# Patient Record
Sex: FEMALE | Race: WHITE | NOT HISPANIC OR LATINO | Employment: UNEMPLOYED | URBAN - METROPOLITAN AREA
[De-identification: names, ages, dates, MRNs, and addresses within clinical notes are randomized per-mention and may not be internally consistent; named-entity substitution may affect disease eponyms.]

---

## 2018-07-25 ENCOUNTER — OFFICE VISIT CONVERTED (OUTPATIENT)
Dept: OTHER | Facility: HOSPITAL | Age: 38
End: 2018-07-25
Attending: INTERNAL MEDICINE

## 2019-01-09 ENCOUNTER — HOSPITAL ENCOUNTER (OUTPATIENT)
Dept: INFUSION THERAPY | Facility: HOSPITAL | Age: 39
Discharge: HOME | End: 2019-01-09
Attending: INTERNAL MEDICINE

## 2019-01-28 ENCOUNTER — HOSPITAL ENCOUNTER (OUTPATIENT)
Dept: INFUSION THERAPY | Facility: HOSPITAL | Age: 39
Discharge: HOME | End: 2019-01-28
Attending: INTERNAL MEDICINE

## 2019-02-12 ENCOUNTER — HOSPITAL ENCOUNTER (OUTPATIENT)
Dept: INFUSION THERAPY | Facility: HOSPITAL | Age: 39
Discharge: HOME | End: 2019-02-12
Attending: INTERNAL MEDICINE

## 2019-03-05 ENCOUNTER — HOSPITAL ENCOUNTER (OUTPATIENT)
Dept: INFUSION THERAPY | Facility: HOSPITAL | Age: 39
Discharge: HOME | End: 2019-03-05
Attending: INTERNAL MEDICINE

## 2019-04-08 ENCOUNTER — HOSPITAL ENCOUNTER (OUTPATIENT)
Dept: INFUSION THERAPY | Facility: HOSPITAL | Age: 39
Discharge: HOME | End: 2019-04-08
Attending: INTERNAL MEDICINE

## 2019-04-20 ENCOUNTER — HOSPITAL ENCOUNTER (OUTPATIENT)
Dept: URGENT CARE | Facility: CLINIC | Age: 39
Discharge: HOME | End: 2019-04-20
Attending: NURSE PRACTITIONER

## 2019-04-23 LAB
AMPICILLIN SUSC ISLT: 0.5
BACTERIA UR CULT: ABNORMAL
CEFOTAXIME SUSC ISLT: <=0.12
CEFTRIAXONE SUSC ISLT: 0.25
LEVOFLOXACIN SUSC ISLT: 1
PENICILLIN G SUSC ISLT: 0.12
TETRACYCLINE SUSC ISLT: >=16
VANCOMYCIN SUSC ISLT: 0.5

## 2019-04-29 ENCOUNTER — HOSPITAL ENCOUNTER (OUTPATIENT)
Dept: INFUSION THERAPY | Facility: HOSPITAL | Age: 39
Discharge: HOME | End: 2019-04-29
Attending: INTERNAL MEDICINE

## 2019-05-15 ENCOUNTER — HOSPITAL ENCOUNTER (OUTPATIENT)
Dept: OTHER | Facility: HOSPITAL | Age: 39
Discharge: HOME | End: 2019-05-15
Attending: INTERNAL MEDICINE

## 2019-05-15 ENCOUNTER — OFFICE VISIT CONVERTED (OUTPATIENT)
Dept: OTHER | Facility: HOSPITAL | Age: 39
End: 2019-05-15
Attending: INTERNAL MEDICINE

## 2019-05-15 LAB
ALBUMIN SERPL-MCNC: 3.8 G/DL (ref 3.5–5)
ALBUMIN/GLOB SERPL: 1.4 {RATIO} (ref 1.4–2.6)
ALP SERPL-CCNC: 55 U/L (ref 42–98)
ALT SERPL-CCNC: 12 U/L (ref 10–40)
ANION GAP SERPL CALC-SCNC: 16 MMOL/L (ref 8–19)
AST SERPL-CCNC: 17 U/L (ref 15–50)
BASOPHILS # BLD AUTO: 0.04 10*3/UL (ref 0–0.2)
BASOPHILS NFR BLD AUTO: 0.6 % (ref 0–3)
BILIRUB SERPL-MCNC: 0.29 MG/DL (ref 0.2–1.3)
BUN SERPL-MCNC: 12 MG/DL (ref 5–25)
BUN/CREAT SERPL: 20 {RATIO} (ref 6–20)
CALCIUM SERPL-MCNC: 9 MG/DL (ref 8.7–10.4)
CHLORIDE SERPL-SCNC: 102 MMOL/L (ref 99–111)
CONV ABS IMM GRAN: 0.01 10*3/UL (ref 0–0.2)
CONV CO2: 26 MMOL/L (ref 22–32)
CONV IMMATURE GRAN: 0.1 % (ref 0–1.8)
CONV TOTAL PROTEIN: 6.6 G/DL (ref 6.3–8.2)
CREAT UR-MCNC: 0.61 MG/DL (ref 0.5–0.9)
DEPRECATED RDW RBC AUTO: 43.5 FL (ref 36.4–46.3)
EOSINOPHIL # BLD AUTO: 0.15 10*3/UL (ref 0–0.7)
EOSINOPHIL # BLD AUTO: 2.2 % (ref 0–7)
ERYTHROCYTE [DISTWIDTH] IN BLOOD BY AUTOMATED COUNT: 12.5 % (ref 11.7–14.4)
GFR SERPLBLD BASED ON 1.73 SQ M-ARVRAT: >60 ML/MIN/{1.73_M2}
GLOBULIN UR ELPH-MCNC: 2.8 G/DL (ref 2–3.5)
GLUCOSE SERPL-MCNC: 72 MG/DL (ref 65–99)
HBA1C MFR BLD: 14 G/DL (ref 12–16)
HCT VFR BLD AUTO: 43 % (ref 37–47)
LYMPHOCYTES # BLD AUTO: 2.52 10*3/UL (ref 1–5)
MCH RBC QN AUTO: 30.6 PG (ref 27–31)
MCHC RBC AUTO-ENTMCNC: 32.6 G/DL (ref 33–37)
MCV RBC AUTO: 94.1 FL (ref 81–99)
MONOCYTES # BLD AUTO: 0.37 10*3/UL (ref 0.2–1.2)
MONOCYTES NFR BLD AUTO: 5.3 % (ref 3–10)
NEUTROPHILS # BLD AUTO: 3.85 10*3/UL (ref 2–8)
NEUTROPHILS NFR BLD AUTO: 55.5 % (ref 30–85)
NRBC CBCN: 0 % (ref 0–0.7)
OSMOLALITY SERPL CALC.SUM OF ELEC: 288 MOSM/KG (ref 273–304)
PLATELET # BLD AUTO: 284 10*3/UL (ref 130–400)
PMV BLD AUTO: 9.3 FL (ref 9.4–12.3)
POTASSIUM SERPL-SCNC: 3.8 MMOL/L (ref 3.5–5.3)
RBC # BLD AUTO: 4.57 10*6/UL (ref 4.2–5.4)
SODIUM SERPL-SCNC: 140 MMOL/L (ref 135–147)
T4 FREE SERPL-MCNC: 1.1 NG/DL (ref 0.9–1.8)
TSH SERPL-ACNC: 1.71 M[IU]/L (ref 0.27–4.2)
VARIANT LYMPHS NFR BLD MANUAL: 36.3 % (ref 20–45)
WBC # BLD AUTO: 6.94 10*3/UL (ref 4.8–10.8)

## 2019-05-17 ENCOUNTER — HOSPITAL ENCOUNTER (OUTPATIENT)
Dept: INFUSION THERAPY | Facility: HOSPITAL | Age: 39
Discharge: HOME | End: 2019-05-17
Attending: INTERNAL MEDICINE

## 2019-06-12 ENCOUNTER — HOSPITAL ENCOUNTER (OUTPATIENT)
Dept: URGENT CARE | Facility: CLINIC | Age: 39
Discharge: HOME | End: 2019-06-12

## 2019-06-14 ENCOUNTER — HOSPITAL ENCOUNTER (OUTPATIENT)
Dept: INFUSION THERAPY | Facility: HOSPITAL | Age: 39
Discharge: HOME | End: 2019-06-14
Attending: INTERNAL MEDICINE

## 2019-06-14 LAB — BACTERIA SPEC AEROBE CULT: NORMAL

## 2019-06-19 ENCOUNTER — HOSPITAL ENCOUNTER (OUTPATIENT)
Dept: URGENT CARE | Facility: CLINIC | Age: 39
Discharge: HOME | End: 2019-06-19
Attending: EMERGENCY MEDICINE

## 2019-07-01 ENCOUNTER — HOSPITAL ENCOUNTER (OUTPATIENT)
Dept: INFUSION THERAPY | Facility: HOSPITAL | Age: 39
Discharge: HOME | End: 2019-07-01
Attending: INTERNAL MEDICINE

## 2019-07-13 ENCOUNTER — HOSPITAL ENCOUNTER (OUTPATIENT)
Dept: URGENT CARE | Facility: CLINIC | Age: 39
Discharge: HOME | End: 2019-07-13

## 2019-07-15 LAB — BACTERIA UR CULT: NORMAL

## 2019-08-05 ENCOUNTER — HOSPITAL ENCOUNTER (OUTPATIENT)
Dept: INFUSION THERAPY | Facility: HOSPITAL | Age: 39
Discharge: HOME | End: 2019-08-05
Attending: INTERNAL MEDICINE

## 2019-08-28 ENCOUNTER — HOSPITAL ENCOUNTER (OUTPATIENT)
Dept: INFUSION THERAPY | Facility: HOSPITAL | Age: 39
Discharge: HOME | End: 2019-08-28
Attending: INTERNAL MEDICINE

## 2019-09-06 ENCOUNTER — HOSPITAL ENCOUNTER (OUTPATIENT)
Dept: GENERAL RADIOLOGY | Facility: HOSPITAL | Age: 39
Discharge: HOME | End: 2019-09-06
Attending: PHYSICIAN ASSISTANT

## 2019-10-15 ENCOUNTER — HOSPITAL ENCOUNTER (OUTPATIENT)
Dept: INFUSION THERAPY | Facility: HOSPITAL | Age: 39
Discharge: HOME | End: 2019-10-15
Attending: INTERNAL MEDICINE

## 2019-10-30 ENCOUNTER — HOSPITAL ENCOUNTER (OUTPATIENT)
Dept: INFUSION THERAPY | Facility: HOSPITAL | Age: 39
Discharge: HOME | End: 2019-10-30
Attending: INTERNAL MEDICINE

## 2019-11-19 ENCOUNTER — HOSPITAL ENCOUNTER (OUTPATIENT)
Dept: INFUSION THERAPY | Facility: HOSPITAL | Age: 39
Discharge: HOME | End: 2019-11-19
Attending: INTERNAL MEDICINE

## 2019-12-10 ENCOUNTER — HOSPITAL ENCOUNTER (OUTPATIENT)
Dept: INFUSION THERAPY | Facility: HOSPITAL | Age: 39
Discharge: HOME | End: 2019-12-10
Attending: INTERNAL MEDICINE

## 2020-01-06 ENCOUNTER — HOSPITAL ENCOUNTER (OUTPATIENT)
Dept: INFUSION THERAPY | Facility: HOSPITAL | Age: 40
Discharge: HOME | End: 2020-01-06
Attending: INTERNAL MEDICINE

## 2020-01-06 LAB
ABO GROUP BLD: NORMAL
BLD GP AB SCN SERPL QL: NORMAL
CONV ABD CONTROL: NORMAL
RH BLD: NORMAL

## 2020-02-03 ENCOUNTER — HOSPITAL ENCOUNTER (OUTPATIENT)
Dept: INFUSION THERAPY | Facility: HOSPITAL | Age: 40
Discharge: HOME | End: 2020-02-03
Attending: INTERNAL MEDICINE

## 2020-02-03 LAB
ABO GROUP BLD: NORMAL
ABO GROUP BLD: NORMAL
BLD GP AB SCN SERPL QL: NORMAL
CONV ABD CONTROL: NORMAL
RH BLD: NORMAL
RH BLD: NORMAL

## 2020-02-26 ENCOUNTER — HOSPITAL ENCOUNTER (OUTPATIENT)
Dept: INFUSION THERAPY | Facility: HOSPITAL | Age: 40
Discharge: HOME | End: 2020-02-26
Attending: INTERNAL MEDICINE

## 2020-03-10 ENCOUNTER — HOSPITAL ENCOUNTER (OUTPATIENT)
Dept: OTHER | Facility: HOSPITAL | Age: 40
Discharge: HOME | End: 2020-03-10
Attending: INTERNAL MEDICINE

## 2020-03-10 LAB
APPEARANCE UR: CLEAR
BILIRUB UR QL: NEGATIVE
COLOR UR: YELLOW
CONV BACTERIA: NEGATIVE
CONV COLLECTION SOURCE (UA): ABNORMAL
CONV HYALINE CASTS IN URINE MICRO: ABNORMAL /[LPF]
CONV UROBILINOGEN IN URINE BY AUTOMATED TEST STRIP: 1 {EHRLICHU}/DL (ref 0.1–1)
GLUCOSE UR QL: NEGATIVE MG/DL
HGB UR QL STRIP: ABNORMAL
KETONES UR QL STRIP: NEGATIVE MG/DL
LEUKOCYTE ESTERASE UR QL STRIP: ABNORMAL
NITRITE UR QL STRIP: NEGATIVE
PH UR STRIP.AUTO: 7.5 [PH] (ref 5–8)
PROT UR QL: ABNORMAL MG/DL
RBC #/AREA URNS HPF: ABNORMAL /[HPF]
SP GR UR: 1.02 (ref 1–1.03)
SQUAMOUS SPT QL MICRO: ABNORMAL /[HPF]
WBC #/AREA URNS HPF: ABNORMAL /[HPF]

## 2020-03-19 ENCOUNTER — HOSPITAL ENCOUNTER (OUTPATIENT)
Dept: INFUSION THERAPY | Facility: HOSPITAL | Age: 40
Discharge: HOME | End: 2020-03-19
Attending: INTERNAL MEDICINE

## 2020-04-06 ENCOUNTER — HOSPITAL ENCOUNTER (OUTPATIENT)
Dept: INFUSION THERAPY | Facility: HOSPITAL | Age: 40
Discharge: HOME | End: 2020-04-06
Attending: INTERNAL MEDICINE

## 2020-04-27 ENCOUNTER — HOSPITAL ENCOUNTER (OUTPATIENT)
Dept: INFUSION THERAPY | Facility: HOSPITAL | Age: 40
Discharge: HOME | End: 2020-04-27
Attending: INTERNAL MEDICINE

## 2020-05-21 ENCOUNTER — HOSPITAL ENCOUNTER (OUTPATIENT)
Dept: INFUSION THERAPY | Facility: HOSPITAL | Age: 40
Setting detail: RECURRING SERIES
Discharge: HOME | End: 2020-08-19
Attending: INTERNAL MEDICINE

## 2020-07-06 ENCOUNTER — OFFICE VISIT CONVERTED (OUTPATIENT)
Dept: OTHER | Facility: HOSPITAL | Age: 40
End: 2020-07-06
Attending: INTERNAL MEDICINE

## 2020-08-19 ENCOUNTER — HOSPITAL ENCOUNTER (OUTPATIENT)
Dept: INFUSION THERAPY | Facility: HOSPITAL | Age: 40
Discharge: HOME | End: 2020-08-19
Attending: INTERNAL MEDICINE

## 2020-09-04 ENCOUNTER — HOSPITAL ENCOUNTER (OUTPATIENT)
Dept: INFUSION THERAPY | Facility: HOSPITAL | Age: 40
Discharge: HOME | End: 2020-09-04
Attending: INTERNAL MEDICINE

## 2020-09-04 LAB
ABO GROUP BLD: NORMAL
BLD GP AB SCN SERPL QL: NORMAL
CONV ABD CONTROL: NORMAL
RH BLD: NORMAL

## 2020-09-25 ENCOUNTER — HOSPITAL ENCOUNTER (OUTPATIENT)
Dept: INFUSION THERAPY | Facility: HOSPITAL | Age: 40
Discharge: HOME | End: 2020-09-25
Attending: INTERNAL MEDICINE

## 2020-09-25 LAB
ABO GROUP BLD: NORMAL
BLD GP AB SCN SERPL QL: NORMAL
CONV ABD CONTROL: NORMAL
RH BLD: NORMAL

## 2020-10-22 ENCOUNTER — HOSPITAL ENCOUNTER (OUTPATIENT)
Dept: INFUSION THERAPY | Facility: HOSPITAL | Age: 40
Discharge: HOME | End: 2020-10-22
Attending: INTERNAL MEDICINE

## 2020-12-02 ENCOUNTER — HOSPITAL ENCOUNTER (OUTPATIENT)
Dept: INFUSION THERAPY | Facility: HOSPITAL | Age: 40
Discharge: HOME | End: 2020-12-02
Attending: INTERNAL MEDICINE

## 2020-12-22 ENCOUNTER — HOSPITAL ENCOUNTER (OUTPATIENT)
Dept: INFUSION THERAPY | Facility: HOSPITAL | Age: 40
Discharge: HOME | End: 2020-12-22
Attending: INTERNAL MEDICINE

## 2020-12-23 LAB
ABO GROUP BLD: NORMAL
BLD GP AB SCN SERPL QL: NORMAL
BLOOD GROUP ANTIBODIES SERPL: NORMAL
CONV ABD CONTROL: NORMAL
RH BLD: NORMAL

## 2021-01-15 ENCOUNTER — HOSPITAL ENCOUNTER (OUTPATIENT)
Dept: INFUSION THERAPY | Facility: HOSPITAL | Age: 41
Discharge: HOME | End: 2021-01-15
Attending: INTERNAL MEDICINE

## 2021-01-15 LAB
ABO GROUP BLD: NORMAL
ABO GROUP BLD: NORMAL
BLD GP AB SCN SERPL QL: NORMAL
BLOOD GROUP ANTIBODIES SERPL: NORMAL
CONV ABD CONTROL: NORMAL
RH BLD: NORMAL
RH BLD: NORMAL

## 2021-02-19 ENCOUNTER — HOSPITAL ENCOUNTER (OUTPATIENT)
Dept: INFUSION THERAPY | Facility: HOSPITAL | Age: 41
Discharge: HOME | End: 2021-02-19
Attending: INTERNAL MEDICINE

## 2021-02-19 LAB
ABO GROUP BLD: NORMAL
BLD GP AB SCN SERPL QL: NORMAL
CONV ABD CONTROL: NORMAL
RH BLD: NORMAL

## 2021-03-23 ENCOUNTER — OFFICE VISIT CONVERTED (OUTPATIENT)
Dept: ORTHOPEDIC SURGERY | Facility: CLINIC | Age: 41
End: 2021-03-23
Attending: ORTHOPAEDIC SURGERY

## 2021-03-25 ENCOUNTER — HOSPITAL ENCOUNTER (OUTPATIENT)
Dept: INFUSION THERAPY | Facility: HOSPITAL | Age: 41
Discharge: HOME | End: 2021-03-25
Attending: INTERNAL MEDICINE

## 2021-04-02 ENCOUNTER — HOSPITAL ENCOUNTER (OUTPATIENT)
Dept: INFUSION THERAPY | Facility: HOSPITAL | Age: 41
Discharge: HOME | End: 2021-04-02
Attending: INTERNAL MEDICINE

## 2021-04-02 LAB
ABO GROUP BLD: NORMAL
BLD GP AB SCN SERPL QL: NORMAL
CONV ABD CONTROL: NORMAL
RH BLD: NORMAL

## 2021-04-21 ENCOUNTER — HOSPITAL ENCOUNTER (OUTPATIENT)
Dept: INFUSION THERAPY | Facility: HOSPITAL | Age: 41
Discharge: HOME | End: 2021-04-21
Attending: INTERNAL MEDICINE

## 2021-05-04 ENCOUNTER — OFFICE VISIT CONVERTED (OUTPATIENT)
Dept: ORTHOPEDIC SURGERY | Facility: CLINIC | Age: 41
End: 2021-05-04
Attending: PHYSICIAN ASSISTANT

## 2021-05-05 ENCOUNTER — HOSPITAL ENCOUNTER (OUTPATIENT)
Dept: INFUSION THERAPY | Facility: HOSPITAL | Age: 41
Discharge: HOME | End: 2021-05-05
Attending: INTERNAL MEDICINE

## 2021-05-05 LAB
ABO GROUP BLD: NORMAL
BLD GP AB SCN SERPL QL: NORMAL
CONV ABD CONTROL: NORMAL
RH BLD: NORMAL

## 2021-05-10 NOTE — H&P
"   History and Physical      Patient Name: Suzy Whitaker   Patient ID: [id number]   Sex: Female   YOB: 1980    Primary Care Provider: Liz Malcolm MD   Referring Provider: iLz Malcolm MD    Visit Date: March 23, 2021    Provider: Raphael Child MD   Location: [facility]   Location Address: [address] [place] [address]  [id number]   Location Phone: [phone]          Chief Complaint  · RIGHT SHOULDER PAIN       History Of Present Illness  Suzy Whitaker is a 41 year old /White female who presents today to [facility]. Patient presents for evaluation of right shoulder pain, she states about a month ago she went bowling and her shoulder started hurting her that night. Patient describes the pain as \"achy\" and worse with activity/ROM, pain at night and sleeping on the shoulder. Patient denies numbness/tingling, denies injury. Patient has tried heat, ice and ibuprofen with no relief. Patient carried a 30 pound baby 2 weeks ago with worse pain in the shoulder, she points to the lateral shoulder as her area of pain. Patient denies weakness, denies numbness/tingling. 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       Past Medical History  Blood Diseases; Mood disorder         Past Surgical History  Gallbladder; Port Placement         Medication List  Abilify 5 mg oral tablet; Effexor XR 75 mg oral capsule,extended release 24hr; Premarin 1.25 mg oral tablet         Allergy List  NO KNOWN DRUG ALLERGIES       Allergies Reconciled  Family Medical History  Diabetes, unspecified type; Renal Cancer         Social History  Alcohol (Current some day); Caffeine (Current every day); Second hand smoke exposure (Never); Tobacco (Never)         Review of Systems  · Constitutional  o Denies  o : fever, chills, weight " "loss  · Cardiovascular  o Denies  o : chest pain, shortness of breath  · Gastrointestinal  o Denies  o : liver disease, heartburn, nausea, blood in stools  · Genitourinary  o Denies  o : painful urination, blood in urine  · Integument  o Denies  o : rash, itching  · Neurologic  o Denies  o : headache, weakness, loss of consciousness  · Musculoskeletal  o Denies  o : painful, swollen joints  · Psychiatric  o Denies  o : drug/alcohol addiction, anxiety, depression      Vitals  Date Time BP Position Site L\R Cuff Size HR RR TEMP (F) WT  HT  BMI kg/m2 BSA m2 O2 Sat FR L/min FiO2 HC       03/23/2021 03:53 PM      59 - R  97.5 234lbs 6oz 5'  5\" 39 2.21 98 %  21%          Physical Examination  · Constitutional  o Appearance  o : well developed, well-nourished, no obvious deformities present  · Head and Face  o Head  o :   § Inspection  § : normocephalic  o Face  o :   § Inspection  § : no facial lesions  · Eyes  o Conjunctivae  o : conjunctivae normal  o Sclerae  o : sclerae white  · Ears, Nose, Mouth and Throat  o Ears  o :   § External Ears  § : appearance within normal limits  § Hearing  § : intact  o Nose  o :   § External Nose  § : appearance normal  · Neck  o Inspection/Palpation  o : normal appearance  o Range of Motion  o : full range of motion  · Respiratory  o Respiratory Effort  o : breathing unlabored  o Inspection of Chest  o : normal appearance  o Auscultation of Lungs  o : no audible wheezing or rales  · Cardiovascular  o Heart  o : regular rate  · Gastrointestinal  o Abdominal Examination  o : soft and non-tender  · Skin and Subcutaneous Tissue  o General Inspection  o : intact, no rashes  · Psychiatric  o General  o : Alert and oriented x3  o Judgement and Insight  o : judgment and insight intact  o Mood and Affect  o : mood normal, affect appropriate  · Right Shoulder  o Inspection  o : Skin is intact, no erythema, no ecchymosis, no swelling or atrophy. Nontender to palpation. Extension 180, Abduction " 130, ER with abduction 85, IR to T10, 5/5 rotator cuff strength. Negative impingment signs.   · Injection Note/Aspiration Note  o Site  o : right shoulder  o Procedure  o : Procedure: After educating the patient, patient gave consent for procedure. After using Chloraprep, the joint space was injected. The patient tolerated the procedure well.   o Medication  o : 80 mg of DepoMedrol with 9cc of 1% Lidocaine  · In Office Procedures  o View  o : AP/LATERAL  o Site  o : RIGHT SHOULDER   o Indication  o : RIGHT SHOULDER PAIN   o Study  o : X-rays ordered, taken in the office, and reviewed today.  o Xray  o : No fracture, no dislocation, no acute osseous abnormality.   o Comparative Data  o : No comparative data found              Assessment  · Right shoulder Bursitis     727.3/M71.9  · Right shoulder pain, unspecified chronicity     719.41/M25.511  · Right shoulder Tendinitis     726.90/M77.9      Plan  · Orders  o Depo-Medrol injection 80mg () - - 03/23/2021   Lot [id number] Exp 02 2022 [employer] Administered by MAURICE Eagle MD  o Shoulder Intra-articular Injection without US [facility]ance [facility] ([id number]) - - 03/23/2021   Lot [id number] Exp 02 01 2022 [employer] Administered by JONNATHAN EAGLE MD   o Shoulder (Right) [facility] Preferred View ([id number]) - 719.41/M25.511 - 03/23/2021  · Medications  o Medications have been Reconciled  o Transition of Care or Provider Policy  · Instructions  o Reviewed the patient's Past Medical, Social, and Family history as well as the ROS at today's visit, no changes.  o Call or return if worsening symptoms.  o Follow up in 2 months.  o The above service was scribed by Juan Alberto Celaya PA-C on my behalf and I attest to the accuracy of the note. jsb  o Reviewed xrays with the patient, discussed diagnosis and treatment options with conservative treatment with shoulder steroid injection and home exercises, she agreed and tolerated the injection well. Follow up in 8 weeks, if no improvement may  consider MRI or another injection.             Electronically Signed by: Mendez Celaya PA-C -Author on March 23, 2021 04:49:34 PM  Electronically Co-signed by: Raphael Child MD -Reviewer on March 23, 2021 07:37:48 PM

## 2021-05-14 VITALS
HEART RATE: 59 BPM | WEIGHT: 234.37 LBS | HEIGHT: 65 IN | BODY MASS INDEX: 39.05 KG/M2 | TEMPERATURE: 97.5 F | OXYGEN SATURATION: 98 %

## 2021-05-14 NOTE — PROGRESS NOTES
"   Progress Note      Patient Name: Suzy Whitaker   Patient ID: [id number]   Sex: Female   YOB: 1980    Primary Care Provider: Liz Malcolm MD   Referring Provider: Liz Malcolm MD    Visit Date: May 4, 2021    Provider: Mendez Celaya PA-C   Location: [facility]   Location Address: [address] [place] [address]  [id number]   Location Phone: [phone]          Chief Complaint  · Follow up right shoulder pain      History Of Present Illness  Suzy Whitaker is a 41 year old /White female who presents today to [facility]. Patient presents for follow-up evaluation of her right shoulder tendinitis/bursitis and shoulder pain. Patient was last seen on 3/23/2021, she received a steroid injection at that visit which she states helped her shoulder and the shoulder \"was great \". Patient states that has been wearing off about a week and she is starting to feel some of the same pain she had prior to the injection. Patient points to the lateral shoulder as her area of worst pain, denies weakness, denies numbness and tingling. 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       Past Medical History  Blood Diseases; Mood disorder         Past Surgical History  Colonoscopy; Gallbladder; Hysterectomy; Port Placement         Medication List  Abilify 5 mg oral tablet; Effexor XR 75 mg oral capsule,extended release 24hr; Premarin 1.25 mg oral tablet         Allergy List  NO KNOWN DRUG ALLERGIES       Allergies Reconciled  Family Medical History  Stroke; Heart Disease; Cancer, Unspecified; Diabetes, unspecified type; Renal Cancer; Blood Diseases         Social History  Alcohol (Current some day); Alcohol Use (Never); Caffeine (Current every day); lives with parents; Recreational Drug Use (Never); Second hand smoke exposure (Never); " "Single.; Tobacco (Never); Working         Review of Systems  · Constitutional  o Denies  o : fever, chills, weight loss  · Cardiovascular  o Denies  o : chest pain, shortness of breath  · Gastrointestinal  o Denies  o : liver disease, heartburn, nausea, blood in stools  · Genitourinary  o Denies  o : painful urination, blood in urine  · Integument  o Denies  o : rash, itching  · Neurologic  o Denies  o : headache, weakness, loss of consciousness  · Musculoskeletal  o Denies  o : painful, swollen joints  · Psychiatric  o Denies  o : drug/alcohol addiction, anxiety, depression      Vitals  Date Time BP Position Site L\R Cuff Size HR RR TEMP (F) WT  HT  BMI kg/m2 BSA m2 O2 Sat FR L/min FiO2 HC       05/04/2021 09:22 AM      75 - R   241lbs 16oz 5'  5\" 40.27 2.24 97 %            Physical Examination  · Constitutional  o Appearance  o : well developed, well-nourished, no obvious deformities present  · Head and Face  o Head  o :   § Inspection  § : normocephalic  o Face  o :   § Inspection  § : no facial lesions  · Eyes  o Conjunctivae  o : conjunctivae normal  o Sclerae  o : sclerae white  · Ears, Nose, Mouth and Throat  o Ears  o :   § External Ears  § : appearance within normal limits  § Hearing  § : intact  o Nose  o :   § External Nose  § : appearance normal  · Neck  o Inspection/Palpation  o : normal appearance  o Range of Motion  o : full range of motion  · Respiratory  o Respiratory Effort  o : breathing unlabored  o Inspection of Chest  o : normal appearance  o Auscultation of Lungs  o : no audible wheezing or rales  · Cardiovascular  o Heart  o : regular rate  · Gastrointestinal  o Abdominal Examination  o : soft and non-tender  · Skin and Subcutaneous Tissue  o General Inspection  o : intact, no rashes  · Psychiatric  o General  o : Alert and oriented x3  o Judgement and Insight  o : judgment and insight intact  o Mood and Affect  o : mood normal, affect appropriate  · Right Shoulder  o Inspection  o : Skin is " intact, no erythema, no ecchymosis, no swelling or atrophy. Nontender to palpation. Extension 180, Abduction 130, ER with abduction 85, IR to T10, 5/5 rotator cuff strength. Negative impingment signs.  · Injection Note/Aspiration Note  o Site  o : right shoulder  o Procedure  o : Procedure: After educating the patient, patient gave consent for procedure. After using Chloraprep, the joint space was injected. The patient tolerated the procedure well.   o Medication  o : 80 mg of DepoMedrol with 9cc of 1% Lidocaine  · Imaging  o Imaging  o : In Office ProceduresView : AP/LATERAL Site : RIGHT SHOULDER Indication : RIGHT SHOULDER PAIN Study : X-rays ordered, taken in the office, and reviewed today. Xray : No fracture, no dislocation, no acute osseous abnormality. Comparative Data : No comparative data found           Assessment  · Right shoulder bursitis     727.3/M71.9  · Pain: Shoulder     719.41/M25.519  · Right shoulder tendinitis     726.90/M77.9      Plan  · Orders  o Depo-Medrol injection 80mg () - 719.41/M25.519 - 05/04/2021   Lot [id number] manufactured by [employer] 09 2021  o Shoulder Intra-articular Injection without US [facility] ([id number]) - 719.41/M25.519 - 05/04/2021   Lot [id number] manufactured by Chapin [employer] 04 2024 Administered by Juan Alberto GARCIA  · Instructions  o Reviewed the patient's Past Medical, Social, and Family history as well as the ROS at today's visit, no changes.  o Call or return if worsening symptoms.  o Follow up in 6-8 weeks.  o Discussed diagnosis and treatment options with the patient, patient like to do a right shoulder steroid injection today which she tolerated well, she was given home exercises advised to work on range of motion and strength, follow-up in 6 to 8 weeks if no improvement we will order MRI.            Electronically Signed by: RADHA Madison-C -Author on May 4, 2021 11:32:45 AM  Electronically Co-signed by: Raphael Child MD -Reviewer on May 4, 2021  10:30:00 PM

## 2021-05-26 ENCOUNTER — HOSPITAL ENCOUNTER (OUTPATIENT)
Dept: INFUSION THERAPY | Facility: HOSPITAL | Age: 41
Discharge: HOME | End: 2021-05-26
Attending: INTERNAL MEDICINE

## 2021-05-26 LAB
ABO GROUP BLD: NORMAL
ABO GROUP BLD: NORMAL
BLD GP AB SCN SERPL QL: NORMAL
CONV ABD CONTROL: NORMAL
RH BLD: NORMAL

## 2021-05-28 VITALS
WEIGHT: 195.5 LBS | TEMPERATURE: 98.9 F | TEMPERATURE: 98.7 F | HEART RATE: 85 BPM | DIASTOLIC BLOOD PRESSURE: 80 MMHG | TEMPERATURE: 97.3 F | RESPIRATION RATE: 16 BRPM | OXYGEN SATURATION: 98 % | HEART RATE: 82 BPM | SYSTOLIC BLOOD PRESSURE: 109 MMHG | RESPIRATION RATE: 20 BRPM | BODY MASS INDEX: 30.69 KG/M2 | HEIGHT: 67 IN | SYSTOLIC BLOOD PRESSURE: 137 MMHG | BODY MASS INDEX: 27.98 KG/M2 | WEIGHT: 178.25 LBS | OXYGEN SATURATION: 97 % | HEART RATE: 96 BPM | DIASTOLIC BLOOD PRESSURE: 66 MMHG | BODY MASS INDEX: 29.54 KG/M2 | RESPIRATION RATE: 14 BRPM | SYSTOLIC BLOOD PRESSURE: 120 MMHG | DIASTOLIC BLOOD PRESSURE: 74 MMHG | HEIGHT: 67 IN | HEIGHT: 67 IN | WEIGHT: 188.2 LBS | OXYGEN SATURATION: 96 %

## 2021-05-28 NOTE — PROGRESS NOTES
"Patient: ANN DEL VALLE     Acct: [id number]     Report: #[id number]  UNIT #: [id number]     : 1980    Encounter Date:2020  PRIMARY CARE: NONE,MD  ***Signed***  --------------------------------------------------------------------------------------------------------------------  Progress Note      DATE: 20      PCP Not Found in Lookup:  none      Referring Provider:  Vahe Ferreira      Chief Complaint      FU Platelet Dysfunction/ Pt Requested for (R) Leg Injury            Subjective      40-year-old white female comes in once a year for checkups that was required     since patient had to be given platelets as needed for delta granule storage pool    disease diagnosed in [facility] in .            About 2 weeks ago patient had the headboard and foot board fall on her right     posterior leg.  Patient was in the emergency room and received platelets to     prevent continued bleeding in the area.  Patient had a tender lump that appeared    in the area of trauma.  This is tender and the patient was worried that it might    mean that she is having a blood clot.            Past Med/Surg History            Past Med/Surg History:   No Hypertension             No Diabetes Mellitus             No Heart Disease             No Cancer             Other (Bipolar disorder)            Social History      Social History:  No Tobacco Use, No Alcohol Use, No Recreational Drug use            Allergies      Coded Allergies:             ADHESIVE TAPE (Verified  Adverse Reaction, Unknown, \"breaks me out\",     20)           ASPIRIN (Verified  Adverse Reaction, Unknown, THROMBOCYTOPENIA, 20)            Medications      Cephalexin Monohydrate (Keflex) 500 Mg Capsule      500 MG PO TID for 7 Days, #21 CAP 0 Refills         Prov: Veza,Kinmundy         20       diphenhydrAMINE HCl (BENADRYL) 25 Mg Capsule      25-50 MG PO Q4H PRN for ALLERGIES, #100 TAB         Reported         10/15/19     "   Acetaminophen Es (Tylenol Extra Strength) 500 Mg Tablet      1000 MG PO Q4H PRN for DISCOMFORT, #100 TAB 0 Refills         Reported         10/15/19       Mvi W Minerals (Thera-M Tablet) 1 Each Tablet      1 TAB PO QDAY, TAB         Reported         10/15/19       Estrogens Conjugated (Premarin) 1.25 Mg Tablet      1.25 MG PO QDAY for 30 Days, #30 TAB         Reported         10/15/19      Current Medications      Current Medications Reviewed 7/6/20            Pain Assessment      Pain Intensity:  5 ((R) Leg)      Description:  Aching, Pressure, Sore      Duration:  Continuous            Review of Systems      General:  Anxiety, Fatigue Scale: (1), Pain Scale: (5)      HEENT:  No Dysphagia      Respiratory:  No Cough      Cardiovascular:  No Chest Pain      Gastrointestinal:  No Nausea, No Vomiting; Constipation, Appetite Good (Good)      Genitourinary:  No Nocturia      Musculoskeletal:  No Joint Effusions, No Joint Tenderness; Aches, Pains ((R)     Leg)      Endocrine:  No Heat Intolerance      Hematologic:  No Bleeding, No Bruising      Allergic/Immunologic:  No Hives      Psychological:  Anxiety; No Depression      Neurological:  Headaches; No Dizziness      Skin:  No Rash, No Open Wounds            Vitals      Height 66.5 in / 168.91 cm      Weight 188 lbs 3.2 oz / 85.32013 kg      BSA 1.96 m2      BMI 29.9 kg/m2      Temperature 97.3 F / 36.28 C      Pulse 96      Respirations 14      Blood Pressure 137/80      Pulse Oximetry 98%            Exam      Constitutional:  No acute distress, Conversant, Pleasant      Eyes:  Anicteric sclerae, Palpebral Conjunctivae (Pink)      Neck:  Supple, Full Range of Motion      Cardiovascular:  RRR; No Murmurs; Normal PMI; No Peripheral Edema      Lungs:  Clear to Ausculation, Normal Respiratory Effort      Abdomen:  Soft; No Masses, No Tenderness      Chest:  Other (Symmetrical)      Lymphatic:  No Neck      Extremities:  No digital cyanosis, No digital ischemia, Normal  gait, Other (No     deformity)      Neurological:  Cranial Nerve II-XII (Intact)      Psychological:  Appropriate affect, Appropriate mood, Intact judgement, Alert      Skin:  Other (Hematoma posterior aspect of the right lower extremity that is     tender and firm)            Lab Results      Laboratory Tests      6/24/20 18:08            Laboratory Tests            Test       3/10/20      13:24 6/24/20      18:08 6/25/20      21:56             Urine Collection Type Clean Catch NA                Urine Color YELLOW NA                Urine Appearance CLEAR NA                Urine pH       7.5 (pH)      (5.0-8.0)                           Urine Specific Gravity       1.021 NA      (1.005-1.030)                           Urine Protein       TRACE mg/dL      (NEGATIVE)                           Urine Glucose (UA)       NEGATIVE mg/dL      (NEGATIVE)                           Urine Ketones       NEGATIVE mg/dL      (NEGATIVE)                           Urine Occult Blood       TRACE NA      (NEGATIVE)                           Urine Nitrite       NEGATIVE NA      (NEGATIVE)                           Urine Bilirubin       NEGATIVE NA      (NEGATIVE)                           Urine Urobilinogen       1.0      {EhrlichU}/dL                           Urine Leukocyte Esterase       MODERATE NA      (NEGATIVE)                           Urine RBC       MOD(11-20)      /(HPF)                           Urine WBC       TNTC(>51)      /(HPF)                           Urine Epithelial Cells 2-5 /(HPF)                Urine Bacteria NEGATIVE NA                Urine Hyaline Casts 2-5 NA                White Blood Count              7.04 10*3/uL      (4.80-10.80)                    Red Blood Count              4.30 10*6/uL      (4.20-5.40)                    Hemoglobin              13.2 g/dL      (12.0-16.0)                    Hematocrit              38.3 %      (37.0-47.0)                    Mean Corpuscular Volume              89.1 fL       (81.0-99.0)                    Mean Corpuscular Hemoglobin              30.7 pg      (27.0-31.0)                    Mean Corpuscular Hemoglobin      Concent        34.5      (33.0-37.0)                    Red Cell Distribution Width              12.5 %      (11.7-14.4)                    RDW Standard Deviation              40.8 fL      (36.4-46.3)                    Platelet Count              251 10*3/uL      (130-400)                    Mean Platelet Volume              8.9 fL      (9.4-12.3)                    Granulocytes (%)              61.0 %      (30.0-85.0)                    Granulocytes #              4.29 10*3/uL      (2.00-8.00)                    Lymphocytes # (Auto)              2.18 10*3/uL      (1.00-5.00)                    Monocytes # (Auto)              0.46 10*3/uL      (0.20-1.20)                    Eosinophils # (Auto)              0.08 10*3/uL      (0.00-0.70)                    Basophils # (Auto)              0.03 10*3/uL      (0.00-0.20)                    Immature Granulocytes %              0.0 %      (0.0-1.8)                    Lymphocytes %              31.0 %      (20.0-45.0)                    Monocytes %              6.5 %      (3.0-10.0)                    Eosinophils %              1.1 %      (0.0-7.0)                    Basophils %              0.4 %      (0.0-3.0)                    Nucleated Red Blood Cells %              0.00 %      (0.00-0.70)                    Immature Granulocytes #              0.00 10*3/uL      (0.00-0.20)                    Prothrombin Time              10.5 s      (9.4-12.0)                    International Ratio (Anticoag      Ther)        0.97 NA      (2.00-3.00)                    Sodium Level              138 mmol/L      (135-147)                    Potassium Level              3.8 mmol/L      (3.5-5.3)                    Chloride Level              101 mmol/L      ()                    Carbon Dioxide Level              24 mmol/L       (22-32)                    Anion Gap              17 mmol/L      (8-19)                    Blood Urea Nitrogen              11 mg/dL      (5-25)                    Creatinine              0.61 mg/dL      (0.50-0.90)                    Glomerular Filtration Rate      Calc        >60      mL/min/{1.73_m2}                    BUN/Creatinine Ratio              18 {ratio}      (6-20)                    Glucose Level              101 mg/dL      (65-99)                    Serum Osmolality  286 (273-304)               Calcium Level              9.4 mg/dL      (8.7-10.4)                    Total Bilirubin              0.19 mg/dL      (0.20-1.30)                    Aspartate Amino Transf      (AST/SGOT)        16 U/L (15-50)                           Alanine Aminotransferase      (ALT/SGPT)        10 U/L (10-40)                           Alkaline Phosphatase  57 U/L (42-98)               Total Protein              7.0 g/dL      (6.3-8.2)                    Albumin              4.2 g/dL      (3.5-5.0)                    Globulin              2.8 g/dL      (2.0-3.5)                    Albumin/Globulin Ratio              1.5 {ratio}      (1.4-2.6)                    Lab Scanned Report              LAB FINAL      CUMULATIVES -            Impression/Problem List      Delta granule storage pool disease      Migraine      History of bipolar disorder      History of foot pain, chronic      Trauma to the posterior aspect of the right leg with resultant hematoma      Notes      New Medications      * Cephalexin Monohydrate (Keflex) 500 MG CAPSULE: 500 MG PO TID 7 Days #21      Discontinued Medications      * Ciprofloxacin HCl (Cipro) 500 MG TABLET: 500 MG PO BID 3 Days #6      Problems:        (1) Hematoma of right lower extremity      Qualifiers:           Qualified Codes:  S80.11XA - Contusion of right lower leg, initial encounter      (2) Platelet dysfunction            Plan      Elevate right leg.      May put ice packs on the  hematoma site      Keflex 500 mg 3 times a day for 7 days because the area is tender and there migh    t be some form of phlebitis as well.            Patient Education:        Anxiety Disorders            PREVENTION      Hx Influenza Vaccination:  No      Influenza Vaccine Declined:  Yes      2 or More Falls Past Year?:  No      Fall Past Year with Injury?:  No      Encouraged to follow-up with:  PCP regarding preventative exams.      Chart initiated by      GERMÁN Peralta MA            Electronically signed by Liz Malcolm  07/06/2020 14:58       Disclaimer: Converted document may not contain table formatting or lab diagrams. Please see [employer] for the authenticated document.

## 2021-05-28 NOTE — PROGRESS NOTES
Patient: ANN DEL VALLE     Acct: [id number]     Report: #[id number]  UNIT #: [id number]     : 1980    Encounter Date:2018  PRIMARY CARE: NONE,MD  ***Signed***  --------------------------------------------------------------------------------------------------------------------  Progress Note      DATE: 18      Primary Care Provider:  STEFAN DOVER      Referring Provider:  Vahe Ferreira      Chief Complaint      Follow up Platelet Dysfunction            Subjective      38-year-old white female comes in for an annual checkup.  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            Patient gets transfusions almost every 3 weeks.      Patient is upset because her PCP  would not give her refills on her meds since     that she needs.  Patient cannot find another PCP.  She has bipolar disease and     unable to find a psychiatrist either.            Past Med/Surg History            Past Med/Surg History:   No Hypertension             No Heart Disease             No Cancer             Other (as above, migraine)            Social History      Social History:  No Tobacco Use, No Alcohol Use, No Recreational Drug use            Allergies      Coded Allergies:             ADHESIVE TAPE (Verified  Allergy, Unknown, BURNING, RASH, 18)           ASPIRIN (Verified  Adverse Reaction, Unknown, THROMBOCYTOPENIA, 18)            Medications      Tranexamic Acid (Lysteda) 650 Mg Tablet      650 MG PO QDAY for 90 Days, #90 TAB 3 Refills         Prov: Armand Malcolmazon         18       Escitalopram Oxalate (Escitalopram Oxalate*) 5 Mg Tablet      10 MG PO QDAY, TAB         Reported         18       (Wellbutrin 300MG)   No Conflict Check      1 TAB PO QAM         Reported         18       Acetamin/Butalbital/Caffeine (Fioricet*) 1 Tab Tablet      1-2 TAB PO Q4-6HPRN         Reported         3/5/12       Estrogens  Conjugated (Premarin) 0.625 Mg Tab      0.625 MG PO QDAY         Reported         9/13/11       Gabapentin (Neurontin) 800 Mg Tablet      800 MG PO TID, TAB 0 Refills         Reported         12/17/09      Current Medications      Current Medications Reviewed 7/25/18            Pain Assessment      Pain Intensity:  0      Description:  None            Review of Systems      General:  No Anxiety, Fatigue Scale: (3), No Pain Scale:, No Fever, No Other      HEENT:  No Dysphagia, Hearing Changes (down), No Rhinorrhea, No Tinnitus, No     Visual Changes, No Nasal Congestion, No Epistaxis, No Other      Respiratory:  No Cough, No Shortness of Air, No Sputum Changes, No Wheezing, No     Hemoptysis, No Congestion, No Other      Cardiovascular:  No Chest Pain, No Pedal Edema, No Orthopnea, No Palpitations,     No Chest Pressure, No Dizziness, No Other      Gastrointestinal:  No Nausea, No Vomiting, No Dysphagia, Constipation, No     Diarrhea, No Appetite Good, Appetite Fair, No Appetite Poor, No Early Satiety,     No Other      Genitourinary:  No Nocturia, No Dysuria, No Other      Musculoskeletal:  No Joint Effusions, Joint Tenderness, Joint Stiffness (back),     No Myalgias, No Aches, No Pains, No Other      Endocrine:  No Heat Intolerance, No Cold Intolerance, No Fatigue, No Blood     Sugar Control, No Other      Hematologic:  Bleeding, Bruising, No Swollen Glands, No Other      Allergic/Immunologic:  No Hives, No Throat closing off, No Nasal drip, No Itchy     eyes, No Hay fever, No Other      Psychological:  Anxiety, Depression, No Other      Neurological:  Headaches, No Dizziness, No Weakness, No Numbness, No Other      Skin:  No Rash, No Open Wounds, No Edema, No Other            Vitals      Height 66.5 in / 168.91 cm      Weight 178 lbs 4 oz / 80.452562 kg      BSA 1.97 m2      BMI 28.3 kg/m2      Temperature 98.9 F / 37.17 C - Oral      Pulse 85      Respirations 20      Blood Pressure 120/74 Sitting, Left Arm       Pulse Oximetry 97%, room air            Exam      Constitutional:  No acute distress, Conversant, Pleasant, Other (upset)      Eyes:  Anicteric sclerae, Palpebral Conjunctivae (pink), ALEXANDR      HENT:  Oropharynx clear, No Erythema, Buccal mucosae (pink)      Neck:  Supple, Full Range of Motion      Cardiovascular:  RRR, No Murmurs, Normal PMI, No Peripheral Edema      Lungs:  Clear to Ausculation, Normal Respiratory Effort      Abdomen:  Soft, NABS, No Tenderness      Chest:  Other (symmetrical and equal)      Lymphatic:  No Neck      Extremities:  No digital cyanosis, Normal gait, Other (no deformity, no     limitation in range of motion)      Neurological:  Cranial Nerve II-XII (Intact), No Focal Sensory deficits      Psychological:  Appropriate affect, Intact judgement, Alert      Skin:  Normal temperature, Other (no dermatosis)            Lab Results            Laboratory Tests            Test        6/15/18      21:40             Lab Scanned Report        BLOOD BANK -      FINAL CUMS            Impression/Problem List      Delta granule storage pool disease      Migraine      History of bipolar disorder      History of foot pain, chronic      Notes      New Medications      * ESCITALOPRAM OXALATE (Escitalopram Oxalate*) 5 MG TABLET: 10 MG PO QDAY      Renewed Medications      * Tranexamic Acid (Lysteda) 650 MG TABLET:       From: 650 MG PO QDAY       To: 650 MG PO QDAY 90 Days #90       Instructions: 1 TAB            Plan      Transfuse as needed      Yearly checkup            Patient Education:        Platelet Count            PREVENTION      Hx Influenza Vaccination:  No (PT REPORTS SHE DOES NOT)      Influenza Vaccine Declined:  Yes      2 or More Falls Past Year?:  No      Fall Past Year with Injury?:  No      Hx Pneumococcal Vaccination:  No      Encouraged to follow-up with:  PCP regarding preventative exams.      Chart initiated by      Lu Hawkins MA                 Disclaimer: Converted document  may not contain table formatting or lab diagrams. Please see [employer] for the authenticated document.

## 2021-05-28 NOTE — PROGRESS NOTES
Patient: ANN DEL VALLE     Acct: [id number]     Report: #[id number]  UNIT #: [id number]     : 1980    Encounter Date:05/15/2019  PRIMARY CARE: NONE,MD  ***Signed***  --------------------------------------------------------------------------------------------------------------------  Progress Note      DATE: 5/15/19      PCP Not Found in Lookup:  none      Referring Provider:  Vahe Ferreira      Chief Complaint      Platelet Dysfunction Follow-up            Subjective      39-year-old white female is been diagnosed with delta granule storage pool     disease in [place], [place],2012.  Patient's symptoms prior to     transfusion is usually extreme fatigue followed by profuse nosebleeding.      Patient is here for her yearly examination.  Patient also wanted to have     platelets given.  Last platelet transfusion was about 2 weeks ago.  Patient is     very upset because she did not get her platelets last week.  Part of the reason     patient is here is because I need to discuss with her possibility of platelet     transfusion failure because of the frequency of platelet administration.            Past Med/Surg History            Past Med/Surg History:   No Hypertension             No Diabetes Mellitus             No Heart Disease             No Cancer             Other (Bipolar disorder)            Cholecystectomy      Hysterectomy      Multiple admissions for bleeding      Multiple outpatient nasal procedures for epistaxis.            Social History      Social History:  No Tobacco Use, No Alcohol Use, No Recreational Drug use            Allergies      Coded Allergies:             ADHESIVE TAPE (Verified  Allergy, Unknown, BURNING, RASH, 18)           ASPIRIN (Verified  Adverse Reaction, Unknown, THROMBOCYTOPENIA, 18)            Medications      Estrogens Conjugated (Premarin) 0.625 Mg Tab      0.625 MG PO QDAY         Reported         11      Current Medications       Current Medications Reviewed 5/15/19            Pain Assessment      Pain Intensity:  5 (Head)      Description:  Aching, Pressure      Duration:  Intermittent            Review of Systems      General:  Fatigue Scale: (7), Pain Scale: (5)      HEENT:  No Dysphagia, No Hearing Changes, No Visual Changes      Respiratory:  No Cough, No Shortness of Air      Cardiovascular:  No Chest Pain, No Palpitations      Gastrointestinal:  No Nausea, No Vomiting, No Constipation; Diarrhea, Appetite     Good      Genitourinary:  No Nocturia, No Dysuria      Musculoskeletal:  Aches, Pains      Endocrine:  No Heat Intolerance; Fatigue      Hematologic:  No Bleeding, No Bruising      Allergic/Immunologic:  No Hives, No Nasal drip      Psychological:  Anxiety, Depression      Neurological:  Headaches; No Dizziness      Skin:  No Rash, No Edema            Vitals      Height 5 ft 6.5 in / 168.91 cm      Weight 195 lbs 8 oz / 88.005243 kg      BSA 1.99 m2      BMI 31.1 kg/m2      Temperature 98.7 F / 37.06 C      Pulse 82      Respirations 16      Blood Pressure 109/66      Pulse Oximetry 96%            Exam      Constitutional:  No acute distress, Conversant, Other (Upset)      Eyes:  Anicteric sclerae, Palpebral Conjunctivae (No petechia), ALEXANDR      HENT:  Oropharynx clear; No Erythema; Buccal mucosae (No petechia)      Neck:  Supple, Full Range of Motion      Cardiovascular:  RRR; No Murmurs; Normal PMI; No Peripheral Edema      Lungs:  Clear to Ausculation, Normal Respiratory Effort      Abdomen:  Soft, NABS; No Masses, No Tenderness      Chest:  Other (Symmetrical and equal)      Lymphatic:  No Neck      Extremities:  No digital cyanosis, No digital ischemia, Normal gait, Other (No     deformity)      Neurological:  Cranial Nerve II-XII (Intact); No Focal Sensory deficits      Psychological:  Appropriate affect, Appropriate mood, Intact judgement, Alert      Skin:  Other (No hematomas no petechiae)            Lab Results       Laboratory Tests      3/14/19 06:25            Laboratory Tests            Test       3/14/19      06:25 4/30/19      21:40             White Blood Count       6.03 10*3/uL      (4.80-10.80)                    Red Blood Count       4.44 10*6/uL      (4.20-5.40)                    Hemoglobin       13.30 g/dL      (12.00-16.00)                    Hematocrit       40.1 %      (37.0-47.0)                    Mean Corpuscular Volume       90.3 fL      (81.0-99.0)                    Mean Corpuscular Hemoglobin       30.0 pg      (27.0-31.0)                    Mean Corpuscular Hemoglobin      Concent 33.2 %      (33.0-37.0)                    Red Cell Distribution Width       12.3 %      (11.7-14.4)                    RDW Standard Deviation       40.8 fL      (36.4-46.3)                    Platelet Count       235.00 10*3/uL      (130..00)                    Mean Platelet Volume       8.9 fL      (9.4-12.3)                    Granulocytes (%)       57.9 %      (30.0-85.0)                    Lymphocytes (%) (Auto)       34.80 %      (20.00-45.00)                    Monocytes (%) (Auto)       5.60 %      (3.00-10.00)                    Eosinophils (%) (Auto)       1.00 %      (0.00-7.00)                    Basophils (%) (Auto)       0.50 %      (0.00-3.00)                    Granulocytes #       3.49 10*3/uL      (2.00-8.00)                    Lymphocytes # (Auto)       2.10 10*3/uL      (1.00-5.00)                    Monocytes # (Auto)       0.34 10*3/uL      (0.20-1.20)                    Eosinophils # (Auto)       0.06 10*3/uL      (0.00-0.70)                    Basophils # (Auto)       0.03 10*3/uL      (0.00-0.20)                    Immature Granulocytes %       0.2 %      (0.0-1.8)                    Nucleated Red Blood Cells %       0.00 %      (0.00-0.70)                    Immature Granulocytes #       0.01 10*3/uL      (0.00-0.20)                    Prothrombin Time       10.4 s      (9.4-12.0)                     International Ratio (Anticoag      Ther) 1.00 NA      (2.00-3.00)                    Activated Partial      Thromboplast Time 49.4      (22.2-34.2)                    Sodium Level       139 mmol/L      (135-147)                    Potassium Level       3.9 mmol/L      (3.5-5.3)                    Chloride Level       102 mmol/L      ()                    Carbon Dioxide Level       27 mmol/L      (22-32)                    Anion Gap       14 mmol/L      (8-19)                    Blood Urea Nitrogen 8 mg/dL (5-25)               Creatinine       0.65 mg/dL      (0.50-0.90)                    Glomerular Filtration Rate      Calc >60      mL/min/{1.73_m2}                    BUN/Creatinine Ratio       12 {ratio}      (6-20)                    Glucose Level       114 mg/dL      (65-99)                    Serum Osmolality 287 (273-304)               Calcium Level       9.0 mg/dL      (8.7-10.4)                    Total Bilirubin       0.30 mg/dL      (0.20-1.30)                    Aspartate Amino Transf      (AST/SGOT) 15 U/L (15-50)                           Alanine Aminotransferase      (ALT/SGPT) 10 U/L (10-40)                           Alkaline Phosphatase 47 U/L (42-98)               Total Protein       6.8 g/dL      (6.3-8.2)                    Albumin       4.1 g/dL      (3.5-5.0)                    Globulin       2.7 g/dL      (2.0-3.5)                    Albumin/Globulin Ratio       1.5 {ratio}      (1.4-2.6)                    Lab Scanned Report              BLOOD BANK -      FINAL CUMS            Impression/Problem List      Delta granule storage pool disease      Migraine      History of bipolar disorder      History of foot pain, chronic      Notes      Discontinued Medications      * Amoxicillin 875 MG TABLET: 875 MG PO BID #20      New Diagnostics      * CBC, Stat         Dx: Platelet dysfunction - D69.1      * Comp Metabolic Panel, Stat         Dx: Platelet dysfunction - D69.1      * Thyroid  Profile, Stat         Dx: Platelet dysfunction - D69.1            Plan      I have explained to this patient that frequent platelet transfusions may lead to    antibody formation that will make her platelet transfusion not as beneficial.      Advised her to go back to ENT to see why she keeps on having nosebleeds.      Patient is aware that her epistaxis may be triggered by hypertension because     when she feels fatigued and anxious her blood pressure will go up.  Patient was     told that she needed to find out there is any other cause of her fatigue besides    the need for platelet transfusion.  At this time patient is upset and is not     very receptive to my recommendations.  She is upset that platelets and were not     available today.      Patient says history of bipolar disorder is not seeing anybody for this.      Encouraged her to look for somebody in town.      We will do a CBC, CMP and thyroid function studies today.      Yearly checkup            Patient Education:        DI for Fatigue            PREVENTION      Hx Influenza Vaccination:  No      Influenza Vaccine Declined:  Yes      2 or More Falls Past Year?:  No      Fall Past Year with Injury?:  No      Encouraged to follow-up with:  PCP regarding preventative exams.      Chart initiated by      SABRINA Wick RN                 Disclaimer: Converted document may not contain table formatting or lab diagrams. Please see [employer] for the authenticated document.

## 2021-06-11 ENCOUNTER — TRANSCRIBE ORDERS (OUTPATIENT)
Dept: ADMINISTRATIVE | Facility: HOSPITAL | Age: 41
End: 2021-06-11

## 2021-06-11 DIAGNOSIS — D69.1 PLATELET DISORDER (HCC): Primary | ICD-10-CM

## 2021-06-14 ENCOUNTER — HOSPITAL ENCOUNTER (OUTPATIENT)
Dept: INFUSION THERAPY | Facility: HOSPITAL | Age: 41
Setting detail: INFUSION SERIES
Discharge: HOME | End: 2021-06-14

## 2021-06-14 VITALS
TEMPERATURE: 98.6 F | BODY MASS INDEX: 40.59 KG/M2 | SYSTOLIC BLOOD PRESSURE: 101 MMHG | HEART RATE: 77 BPM | WEIGHT: 243.61 LBS | DIASTOLIC BLOOD PRESSURE: 49 MMHG | RESPIRATION RATE: 16 BRPM | OXYGEN SATURATION: 98 % | HEIGHT: 65 IN

## 2021-06-14 DIAGNOSIS — Z95.828 PORT-A-CATH IN PLACE: Primary | ICD-10-CM

## 2021-06-14 LAB
ABO GROUP BLD: NORMAL
ABO GROUP BLD: NORMAL
BLD GP AB SCN SERPL QL: NEGATIVE
PLATELET # BLD AUTO: 241 10*3/MM3 (ref 140–450)
RH BLD: POSITIVE
RH BLD: POSITIVE
T&S EXPIRATION DATE: NORMAL

## 2021-06-14 PROCEDURE — 86900 BLOOD TYPING SEROLOGIC ABO: CPT

## 2021-06-14 PROCEDURE — P9037 PLATE PHERES LEUKOREDU IRRAD: HCPCS

## 2021-06-14 PROCEDURE — 36430 TRANSFUSION BLD/BLD COMPNT: CPT

## 2021-06-14 PROCEDURE — 86850 RBC ANTIBODY SCREEN: CPT | Performed by: INTERNAL MEDICINE

## 2021-06-14 PROCEDURE — 85049 AUTOMATED PLATELET COUNT: CPT | Performed by: INTERNAL MEDICINE

## 2021-06-14 PROCEDURE — 86900 BLOOD TYPING SEROLOGIC ABO: CPT | Performed by: INTERNAL MEDICINE

## 2021-06-14 PROCEDURE — 86901 BLOOD TYPING SEROLOGIC RH(D): CPT | Performed by: INTERNAL MEDICINE

## 2021-06-14 PROCEDURE — 25010000002 HEPARIN LOCK FLUSH PER 10 UNITS: Performed by: INTERNAL MEDICINE

## 2021-06-14 PROCEDURE — 86901 BLOOD TYPING SEROLOGIC RH(D): CPT

## 2021-06-14 RX ORDER — SODIUM CHLORIDE 0.9 % (FLUSH) 0.9 %
10 SYRINGE (ML) INJECTION AS NEEDED
Status: DISCONTINUED | OUTPATIENT
Start: 2021-06-14 | End: 2021-06-16 | Stop reason: HOSPADM

## 2021-06-14 RX ORDER — SODIUM CHLORIDE 0.9 % (FLUSH) 0.9 %
10 SYRINGE (ML) INJECTION AS NEEDED
Status: CANCELLED | OUTPATIENT
Start: 2021-06-14

## 2021-06-14 RX ORDER — HEPARIN SODIUM (PORCINE) LOCK FLUSH IV SOLN 100 UNIT/ML 100 UNIT/ML
400 SOLUTION INTRAVENOUS AS NEEDED
Status: DISCONTINUED | OUTPATIENT
Start: 2021-06-14 | End: 2021-06-16 | Stop reason: HOSPADM

## 2021-06-14 RX ORDER — HEPARIN SODIUM (PORCINE) LOCK FLUSH IV SOLN 100 UNIT/ML 100 UNIT/ML
400 SOLUTION INTRAVENOUS AS NEEDED
Status: CANCELLED | OUTPATIENT
Start: 2021-06-14

## 2021-06-14 RX ADMIN — HEPARIN SODIUM (PORCINE) LOCK FLUSH IV SOLN 100 UNIT/ML 400 UNITS: 100 SOLUTION at 19:18

## 2021-06-15 ENCOUNTER — OFFICE VISIT (OUTPATIENT)
Dept: ORTHOPEDIC SURGERY | Facility: CLINIC | Age: 41
End: 2021-06-15

## 2021-06-15 VITALS — HEART RATE: 80 BPM | BODY MASS INDEX: 41.05 KG/M2 | OXYGEN SATURATION: 96 % | HEIGHT: 65 IN | WEIGHT: 246.4 LBS

## 2021-06-15 DIAGNOSIS — M25.511 RIGHT SHOULDER PAIN, UNSPECIFIED CHRONICITY: Primary | ICD-10-CM

## 2021-06-15 PROCEDURE — 99213 OFFICE O/P EST LOW 20 MIN: CPT | Performed by: PHYSICIAN ASSISTANT

## 2021-06-15 RX ORDER — BUSPIRONE HYDROCHLORIDE 7.5 MG/1
TABLET ORAL
COMMUNITY
Start: 2021-05-14

## 2021-06-15 RX ORDER — VENLAFAXINE HYDROCHLORIDE 75 MG/1
CAPSULE, EXTENDED RELEASE ORAL
COMMUNITY

## 2021-06-15 RX ORDER — ESTRADIOL 2 MG/1
RING VAGINAL
COMMUNITY
Start: 2021-04-07

## 2021-06-15 RX ORDER — ARIPIPRAZOLE 5 MG/1
TABLET ORAL
COMMUNITY

## 2021-06-15 RX ORDER — BUTALBITAL, ACETAMINOPHEN AND CAFFEINE 50; 325; 40 MG/1; MG/1; MG/1
1 TABLET ORAL 4 TIMES DAILY PRN
COMMUNITY
Start: 2021-04-19

## 2021-06-15 RX ORDER — IBUPROFEN 800 MG/1
800 TABLET ORAL 4 TIMES DAILY PRN
COMMUNITY
Start: 2021-04-19

## 2021-06-15 RX ORDER — ESTROGENS, CONJUGATED 1.25 MG
1.25 TABLET ORAL DAILY
COMMUNITY
Start: 2021-04-12 | End: 2022-03-28 | Stop reason: CLARIF

## 2021-06-15 RX ORDER — DICLOFENAC SODIUM 75 MG/1
75 TABLET, DELAYED RELEASE ORAL 2 TIMES DAILY PRN
COMMUNITY
Start: 2021-04-27

## 2021-06-15 NOTE — PROGRESS NOTES
"Chief Complaint  Pain of the Right Shoulder    Subjective          Suzy Whitaker presents to [facility] for   History of Present Illness    Patient presents for follow-up evaluation of right shoulder pain, right shoulder tendinitis and bursitis.  Patient has received 2 steroid injections in her shoulder, 1 on 3/23/2021 by Dr. Child and one at her last visit on 5/4/2021.  Both times the injection helped but only lasted for about 4 5 weeks.  Patient states her pain has returned in the shoulder she points to the lateral shoulder as her area of worst pain, patient states her pain is worse when she lays on this shoulder, pain with driving, she states pain is worse at night.  Patient has tried home exercises with no relief, ibuprofen and diclofenac with no relief she has also tried Voltaren gel with no relief.  Patient denies weakness, denies numbness and tingling.  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  Allergies   Allergen Reactions   • Adhesive Tape Rash   • Morphine Other (See Comments)     CAUSES CHEST PAIN        Social History     Socioeconomic History   • Marital status:      Spouse name: Not on file   • Number of children: Not on file   • Years of education: Not on file   • Highest education level: Not on file   Tobacco Use   • Smoking status: Never Smoker   • Smokeless tobacco: Never Used   • Tobacco comment: 01/01/1990   Vaping Use   • Vaping Use: Never used   Substance and Sexual Activity   • Drug use: Never        REVIEW OF SYSTEMS    Constitutional: Denies fevers, chills, weight loss  Cardiovascular: Denies chest pain, shortness of breath  Skin: Denies rashes, acute skin changes  Neurologic: Denies headache, loss of consciousness  MSK: Right shoulder pain      Objective   Vital Signs:   Pulse 80   Ht 165.1 cm (65\")   Wt 112 kg (246 " lb 6.4 oz)   SpO2 96%   BMI 41.00 kg/m²     Body mass index is 41 kg/m².    Physical Exam     Skin is intact, no erythema, no ecchymosis, no swelling or atrophy. Nontender to palpation. Extension 180, Abduction 130, ER with abduction 85, IR to T10, 5/5 rotator cuff strength. Negative impingment signs.    Procedures    Imaging Results (Most Recent)     None          In Office ProceduresView : AP/LATERAL Site : RIGHT SHOULDER Indication : RIGHT SHOULDER PAIN Study : X-rays ordered, taken in the office, and reviewed today. Xray : No fracture, no dislocation, no acute osseous abnormality. Comparative Data : No comparative data found    Result Review :{Labs  Result Review  Imaging  Med Tab  Media  Procedures :23}   The following data was reviewed by: RADHA Boyce on 06/15/2021:               Assessment and Plan    Diagnoses and all orders for this visit:    1. Right shoulder pain, unspecified chronicity (Primary)  -     MRI Shoulder Right Without Contrast; Future    Discussed diagnosis and treatment options with the patient, patient was advised based on persistent pain and symptoms that we will order MRI of the right shoulder for further evaluation, she was given a prescription for pain cream medication, use as directed.  Follow-up after MRI.      Follow Up   Return for After MRI.  Patient was given instructions and counseling regarding her condition or for health maintenance advice. Please see specific information pulled into the AVS if appropriate.

## 2021-06-17 LAB
BH BB BLOOD EXPIRATION DATE: NORMAL
BH BB BLOOD TYPE BARCODE: 5100
BH BB DISPENSE STATUS: NORMAL
BH BB PRODUCT CODE: NORMAL
BH BB UNIT NUMBER: NORMAL
UNIT  ABO: NORMAL
UNIT  RH: NORMAL

## 2021-06-21 ENCOUNTER — HOSPITAL ENCOUNTER (OUTPATIENT)
Dept: MRI IMAGING | Facility: HOSPITAL | Age: 41
Discharge: HOME | End: 2021-06-21
Admitting: PHYSICIAN ASSISTANT

## 2021-06-21 DIAGNOSIS — M25.511 RIGHT SHOULDER PAIN, UNSPECIFIED CHRONICITY: ICD-10-CM

## 2021-06-21 PROCEDURE — 73221 MRI JOINT UPR EXTREM W/O DYE: CPT

## 2021-06-21 PROCEDURE — 73221 MRI JOINT UPR EXTREM W/O DYE: CPT | Performed by: RADIOLOGY

## 2021-07-09 ENCOUNTER — TRANSCRIBE ORDERS (OUTPATIENT)
Dept: ADMINISTRATIVE | Facility: HOSPITAL | Age: 41
End: 2021-07-09

## 2021-07-09 DIAGNOSIS — D69.1 PLATELET DISORDER (HCC): Primary | ICD-10-CM

## 2021-07-13 ENCOUNTER — HOSPITAL ENCOUNTER (OUTPATIENT)
Dept: INFUSION THERAPY | Facility: HOSPITAL | Age: 41
Setting detail: INFUSION SERIES
Discharge: HOME | End: 2021-07-13

## 2021-07-13 VITALS
HEART RATE: 64 BPM | TEMPERATURE: 97.5 F | SYSTOLIC BLOOD PRESSURE: 115 MMHG | OXYGEN SATURATION: 99 % | RESPIRATION RATE: 18 BRPM | DIASTOLIC BLOOD PRESSURE: 57 MMHG

## 2021-07-13 DIAGNOSIS — Z95.828 PORT-A-CATH IN PLACE: Primary | ICD-10-CM

## 2021-07-13 LAB
ABO GROUP BLD: NORMAL
ABO GROUP BLD: NORMAL
BLD GP AB SCN SERPL QL: NEGATIVE
RH BLD: POSITIVE
RH BLD: POSITIVE
T&S EXPIRATION DATE: NORMAL

## 2021-07-13 PROCEDURE — 86900 BLOOD TYPING SEROLOGIC ABO: CPT | Performed by: INTERNAL MEDICINE

## 2021-07-13 PROCEDURE — 86901 BLOOD TYPING SEROLOGIC RH(D): CPT

## 2021-07-13 PROCEDURE — 25010000002 HEPARIN LOCK FLUSH PER 10 UNITS: Performed by: INTERNAL MEDICINE

## 2021-07-13 PROCEDURE — 86850 RBC ANTIBODY SCREEN: CPT | Performed by: INTERNAL MEDICINE

## 2021-07-13 PROCEDURE — 86901 BLOOD TYPING SEROLOGIC RH(D): CPT | Performed by: INTERNAL MEDICINE

## 2021-07-13 PROCEDURE — 36430 TRANSFUSION BLD/BLD COMPNT: CPT

## 2021-07-13 PROCEDURE — P9037 PLATE PHERES LEUKOREDU IRRAD: HCPCS

## 2021-07-13 PROCEDURE — 86900 BLOOD TYPING SEROLOGIC ABO: CPT

## 2021-07-13 RX ORDER — HEPARIN SODIUM (PORCINE) LOCK FLUSH IV SOLN 100 UNIT/ML 100 UNIT/ML
400 SOLUTION INTRAVENOUS AS NEEDED
Status: DISCONTINUED | OUTPATIENT
Start: 2021-07-13 | End: 2021-07-15 | Stop reason: HOSPADM

## 2021-07-13 RX ORDER — SODIUM CHLORIDE 0.9 % (FLUSH) 0.9 %
10 SYRINGE (ML) INJECTION AS NEEDED
Status: CANCELLED | OUTPATIENT
Start: 2021-07-13

## 2021-07-13 RX ORDER — HEPARIN SODIUM (PORCINE) LOCK FLUSH IV SOLN 100 UNIT/ML 100 UNIT/ML
400 SOLUTION INTRAVENOUS AS NEEDED
Status: CANCELLED | OUTPATIENT
Start: 2021-07-13

## 2021-07-13 RX ADMIN — HEPARIN SODIUM (PORCINE) LOCK FLUSH IV SOLN 100 UNIT/ML 400 UNITS: 100 SOLUTION at 13:24

## 2021-07-15 VITALS — OXYGEN SATURATION: 97 % | HEART RATE: 75 BPM | BODY MASS INDEX: 40.32 KG/M2 | WEIGHT: 242 LBS | HEIGHT: 65 IN

## 2021-07-29 ENCOUNTER — TRANSCRIBE ORDERS (OUTPATIENT)
Dept: ADMINISTRATIVE | Facility: HOSPITAL | Age: 41
End: 2021-07-29

## 2021-07-29 DIAGNOSIS — D69.1 QUALITATIVE PLATELET DEFECTS (HCC): Primary | ICD-10-CM

## 2021-08-02 ENCOUNTER — HOSPITAL ENCOUNTER (OUTPATIENT)
Dept: INFUSION THERAPY | Facility: HOSPITAL | Age: 41
Setting detail: INFUSION SERIES
Discharge: HOME | End: 2021-08-02

## 2021-08-02 VITALS
HEART RATE: 69 BPM | WEIGHT: 240.3 LBS | OXYGEN SATURATION: 99 % | RESPIRATION RATE: 16 BRPM | TEMPERATURE: 98 F | SYSTOLIC BLOOD PRESSURE: 118 MMHG | HEIGHT: 65 IN | DIASTOLIC BLOOD PRESSURE: 65 MMHG | BODY MASS INDEX: 40.04 KG/M2

## 2021-08-02 DIAGNOSIS — Z95.828 PORT-A-CATH IN PLACE: Primary | ICD-10-CM

## 2021-08-02 LAB
ABO GROUP BLD: NORMAL
BLD GP AB SCN SERPL QL: NEGATIVE
RH BLD: POSITIVE
T&S EXPIRATION DATE: NORMAL

## 2021-08-02 PROCEDURE — 86900 BLOOD TYPING SEROLOGIC ABO: CPT | Performed by: INTERNAL MEDICINE

## 2021-08-02 PROCEDURE — 86850 RBC ANTIBODY SCREEN: CPT | Performed by: INTERNAL MEDICINE

## 2021-08-02 PROCEDURE — P9037 PLATE PHERES LEUKOREDU IRRAD: HCPCS

## 2021-08-02 PROCEDURE — 86901 BLOOD TYPING SEROLOGIC RH(D): CPT | Performed by: INTERNAL MEDICINE

## 2021-08-02 PROCEDURE — 25010000002 HEPARIN LOCK FLUSH PER 10 UNITS: Performed by: INTERNAL MEDICINE

## 2021-08-02 PROCEDURE — 36430 TRANSFUSION BLD/BLD COMPNT: CPT

## 2021-08-02 RX ORDER — SODIUM CHLORIDE 0.9 % (FLUSH) 0.9 %
10 SYRINGE (ML) INJECTION AS NEEDED
Status: DISCONTINUED | OUTPATIENT
Start: 2021-08-02 | End: 2021-08-04 | Stop reason: HOSPADM

## 2021-08-02 RX ORDER — SODIUM CHLORIDE 0.9 % (FLUSH) 0.9 %
10 SYRINGE (ML) INJECTION AS NEEDED
Status: CANCELLED | OUTPATIENT
Start: 2021-08-02

## 2021-08-02 RX ORDER — HEPARIN SODIUM (PORCINE) LOCK FLUSH IV SOLN 100 UNIT/ML 100 UNIT/ML
400 SOLUTION INTRAVENOUS AS NEEDED
Status: DISCONTINUED | OUTPATIENT
Start: 2021-08-02 | End: 2021-08-04 | Stop reason: HOSPADM

## 2021-08-02 RX ORDER — HEPARIN SODIUM (PORCINE) LOCK FLUSH IV SOLN 100 UNIT/ML 100 UNIT/ML
400 SOLUTION INTRAVENOUS AS NEEDED
Status: CANCELLED | OUTPATIENT
Start: 2021-08-02

## 2021-08-02 RX ADMIN — SODIUM CHLORIDE, PRESERVATIVE FREE 10 ML: 5 INJECTION INTRAVENOUS at 12:54

## 2021-08-02 RX ADMIN — HEPARIN SODIUM (PORCINE) LOCK FLUSH IV SOLN 100 UNIT/ML 400 UNITS: 100 SOLUTION at 12:54

## 2021-08-27 ENCOUNTER — TRANSCRIBE ORDERS (OUTPATIENT)
Dept: ADMINISTRATIVE | Facility: HOSPITAL | Age: 41
End: 2021-08-27

## 2021-08-27 DIAGNOSIS — D69.1 PLATELET DISORDER (HCC): Primary | ICD-10-CM

## 2021-08-30 ENCOUNTER — HOSPITAL ENCOUNTER (OUTPATIENT)
Dept: INFUSION THERAPY | Facility: HOSPITAL | Age: 41
Setting detail: INFUSION SERIES
Discharge: HOME | End: 2021-08-30

## 2021-08-30 VITALS
HEIGHT: 65 IN | TEMPERATURE: 98.1 F | HEART RATE: 66 BPM | BODY MASS INDEX: 40.4 KG/M2 | RESPIRATION RATE: 18 BRPM | WEIGHT: 242.51 LBS | SYSTOLIC BLOOD PRESSURE: 112 MMHG | OXYGEN SATURATION: 100 % | DIASTOLIC BLOOD PRESSURE: 46 MMHG

## 2021-08-30 DIAGNOSIS — Z95.828 PORT-A-CATH IN PLACE: Primary | ICD-10-CM

## 2021-08-30 PROCEDURE — P9037 PLATE PHERES LEUKOREDU IRRAD: HCPCS

## 2021-08-30 PROCEDURE — 86850 RBC ANTIBODY SCREEN: CPT | Performed by: INTERNAL MEDICINE

## 2021-08-30 PROCEDURE — 25010000002 HEPARIN LOCK FLUSH PER 10 UNITS: Performed by: INTERNAL MEDICINE

## 2021-08-30 PROCEDURE — 86901 BLOOD TYPING SEROLOGIC RH(D): CPT | Performed by: INTERNAL MEDICINE

## 2021-08-30 PROCEDURE — 36430 TRANSFUSION BLD/BLD COMPNT: CPT

## 2021-08-30 PROCEDURE — 86900 BLOOD TYPING SEROLOGIC ABO: CPT | Performed by: INTERNAL MEDICINE

## 2021-08-30 RX ORDER — HEPARIN SODIUM (PORCINE) LOCK FLUSH IV SOLN 100 UNIT/ML 100 UNIT/ML
400 SOLUTION INTRAVENOUS AS NEEDED
Status: CANCELLED | OUTPATIENT
Start: 2021-08-30

## 2021-08-30 RX ORDER — HEPARIN SODIUM (PORCINE) LOCK FLUSH IV SOLN 100 UNIT/ML 100 UNIT/ML
400 SOLUTION INTRAVENOUS AS NEEDED
Status: DISCONTINUED | OUTPATIENT
Start: 2021-08-30 | End: 2021-09-01 | Stop reason: HOSPADM

## 2021-08-30 RX ORDER — SODIUM CHLORIDE 0.9 % (FLUSH) 0.9 %
10 SYRINGE (ML) INJECTION AS NEEDED
Status: CANCELLED | OUTPATIENT
Start: 2021-08-30

## 2021-08-30 RX ADMIN — HEPARIN SODIUM (PORCINE) LOCK FLUSH IV SOLN 100 UNIT/ML 400 UNITS: 100 SOLUTION at 12:36

## 2021-10-08 ENCOUNTER — TRANSCRIBE ORDERS (OUTPATIENT)
Dept: ADMINISTRATIVE | Facility: HOSPITAL | Age: 41
End: 2021-10-08

## 2021-10-08 DIAGNOSIS — D69.1 PLATELET DISORDER (HCC): Primary | ICD-10-CM

## 2021-10-11 ENCOUNTER — HOSPITAL ENCOUNTER (OUTPATIENT)
Dept: INFUSION THERAPY | Facility: HOSPITAL | Age: 41
Discharge: HOME | End: 2021-10-11
Admitting: INTERNAL MEDICINE

## 2021-10-11 VITALS
TEMPERATURE: 98 F | HEART RATE: 66 BPM | SYSTOLIC BLOOD PRESSURE: 129 MMHG | RESPIRATION RATE: 20 BRPM | BODY MASS INDEX: 40 KG/M2 | WEIGHT: 240.08 LBS | DIASTOLIC BLOOD PRESSURE: 68 MMHG | OXYGEN SATURATION: 98 % | HEIGHT: 65 IN

## 2021-10-11 DIAGNOSIS — Z95.828 PORT-A-CATH IN PLACE: Primary | ICD-10-CM

## 2021-10-11 PROCEDURE — P9037 PLATE PHERES LEUKOREDU IRRAD: HCPCS

## 2021-10-11 PROCEDURE — 36430 TRANSFUSION BLD/BLD COMPNT: CPT

## 2021-10-11 PROCEDURE — 25010000002 HEPARIN LOCK FLUSH PER 10 UNITS: Performed by: INTERNAL MEDICINE

## 2021-10-11 PROCEDURE — 86850 RBC ANTIBODY SCREEN: CPT | Performed by: INTERNAL MEDICINE

## 2021-10-11 PROCEDURE — P9100 PATHOGEN TEST FOR PLATELETS: HCPCS

## 2021-10-11 PROCEDURE — 86901 BLOOD TYPING SEROLOGIC RH(D): CPT | Performed by: INTERNAL MEDICINE

## 2021-10-11 PROCEDURE — 86900 BLOOD TYPING SEROLOGIC ABO: CPT | Performed by: INTERNAL MEDICINE

## 2021-10-11 RX ORDER — SODIUM CHLORIDE 0.9 % (FLUSH) 0.9 %
10 SYRINGE (ML) INJECTION AS NEEDED
Status: DISCONTINUED | OUTPATIENT
Start: 2021-10-11 | End: 2021-10-13 | Stop reason: HOSPADM

## 2021-10-11 RX ORDER — HEPARIN SODIUM (PORCINE) LOCK FLUSH IV SOLN 100 UNIT/ML 100 UNIT/ML
400 SOLUTION INTRAVENOUS AS NEEDED
Status: CANCELLED | OUTPATIENT
Start: 2021-10-11

## 2021-10-11 RX ORDER — SODIUM CHLORIDE 0.9 % (FLUSH) 0.9 %
10 SYRINGE (ML) INJECTION AS NEEDED
Status: CANCELLED | OUTPATIENT
Start: 2021-10-11

## 2021-10-11 RX ORDER — HEPARIN SODIUM (PORCINE) LOCK FLUSH IV SOLN 100 UNIT/ML 100 UNIT/ML
400 SOLUTION INTRAVENOUS AS NEEDED
Status: DISCONTINUED | OUTPATIENT
Start: 2021-10-11 | End: 2021-10-13 | Stop reason: HOSPADM

## 2021-10-11 RX ADMIN — HEPARIN SODIUM (PORCINE) LOCK FLUSH IV SOLN 100 UNIT/ML 400 UNITS: 100 SOLUTION at 11:49

## 2021-10-11 NOTE — ADDENDUM NOTE
Encounter addended by: Torin Herr RN on: 10/11/2021 11:51 AM   Actions taken: Charge Capture section accepted

## 2021-10-27 ENCOUNTER — TRANSCRIBE ORDERS (OUTPATIENT)
Dept: ADMINISTRATIVE | Facility: HOSPITAL | Age: 41
End: 2021-10-27

## 2021-10-27 DIAGNOSIS — D69.1: Primary | ICD-10-CM

## 2021-10-29 ENCOUNTER — APPOINTMENT (OUTPATIENT)
Dept: INFUSION THERAPY | Facility: HOSPITAL | Age: 41
End: 2021-10-29
Attending: INTERNAL MEDICINE

## 2021-10-29 ENCOUNTER — HOSPITAL ENCOUNTER (OUTPATIENT)
Dept: INFUSION THERAPY | Facility: HOSPITAL | Age: 41
Discharge: HOME | End: 2021-10-29
Attending: INTERNAL MEDICINE | Admitting: INTERNAL MEDICINE

## 2021-10-29 VITALS
HEART RATE: 68 BPM | HEIGHT: 65 IN | OXYGEN SATURATION: 96 % | BODY MASS INDEX: 39.82 KG/M2 | TEMPERATURE: 98.2 F | WEIGHT: 238.98 LBS | RESPIRATION RATE: 20 BRPM | SYSTOLIC BLOOD PRESSURE: 114 MMHG | DIASTOLIC BLOOD PRESSURE: 58 MMHG

## 2021-10-29 DIAGNOSIS — Z95.828 PORT-A-CATH IN PLACE: Primary | ICD-10-CM

## 2021-10-29 PROCEDURE — P9100 PATHOGEN TEST FOR PLATELETS: HCPCS

## 2021-10-29 PROCEDURE — 86900 BLOOD TYPING SEROLOGIC ABO: CPT | Performed by: INTERNAL MEDICINE

## 2021-10-29 PROCEDURE — 25010000002 HEPARIN LOCK FLUSH PER 10 UNITS: Performed by: INTERNAL MEDICINE

## 2021-10-29 PROCEDURE — 86901 BLOOD TYPING SEROLOGIC RH(D): CPT | Performed by: INTERNAL MEDICINE

## 2021-10-29 PROCEDURE — 36430 TRANSFUSION BLD/BLD COMPNT: CPT

## 2021-10-29 PROCEDURE — 86850 RBC ANTIBODY SCREEN: CPT | Performed by: INTERNAL MEDICINE

## 2021-10-29 PROCEDURE — P9037 PLATE PHERES LEUKOREDU IRRAD: HCPCS

## 2021-10-29 RX ORDER — HEPARIN SODIUM (PORCINE) LOCK FLUSH IV SOLN 100 UNIT/ML 100 UNIT/ML
400 SOLUTION INTRAVENOUS AS NEEDED
Status: DISCONTINUED | OUTPATIENT
Start: 2021-10-29 | End: 2021-10-31 | Stop reason: HOSPADM

## 2021-10-29 RX ORDER — SODIUM CHLORIDE 0.9 % (FLUSH) 0.9 %
10 SYRINGE (ML) INJECTION AS NEEDED
Status: CANCELLED | OUTPATIENT
Start: 2021-10-29

## 2021-10-29 RX ORDER — HEPARIN SODIUM (PORCINE) LOCK FLUSH IV SOLN 100 UNIT/ML 100 UNIT/ML
400 SOLUTION INTRAVENOUS AS NEEDED
Status: CANCELLED | OUTPATIENT
Start: 2021-10-29

## 2021-10-29 RX ADMIN — HEPARIN SODIUM (PORCINE) LOCK FLUSH IV SOLN 100 UNIT/ML 400 UNITS: 100 SOLUTION at 11:16

## 2021-12-08 ENCOUNTER — TRANSCRIBE ORDERS (OUTPATIENT)
Dept: ADMINISTRATIVE | Facility: HOSPITAL | Age: 41
End: 2021-12-08

## 2021-12-08 DIAGNOSIS — D69.1 PLATELET DISORDER (HCC): Primary | ICD-10-CM

## 2021-12-09 ENCOUNTER — HOSPITAL ENCOUNTER (OUTPATIENT)
Dept: INFUSION THERAPY | Facility: HOSPITAL | Age: 41
Discharge: HOME | End: 2021-12-09

## 2021-12-09 ENCOUNTER — HOSPITAL ENCOUNTER (OUTPATIENT)
Dept: INFUSION THERAPY | Facility: HOSPITAL | Age: 41
Discharge: HOME | End: 2021-12-09
Attending: INTERNAL MEDICINE | Admitting: INTERNAL MEDICINE

## 2021-12-09 VITALS
BODY MASS INDEX: 38.93 KG/M2 | HEIGHT: 65 IN | OXYGEN SATURATION: 99 % | DIASTOLIC BLOOD PRESSURE: 68 MMHG | WEIGHT: 233.69 LBS | HEART RATE: 70 BPM | SYSTOLIC BLOOD PRESSURE: 108 MMHG | TEMPERATURE: 97.7 F | RESPIRATION RATE: 20 BRPM

## 2021-12-09 DIAGNOSIS — Z95.828 PORT-A-CATH IN PLACE: Primary | ICD-10-CM

## 2021-12-09 PROBLEM — D69.1 QUALITATIVE PLATELET DISORDER (HCC): Status: ACTIVE | Noted: 2021-12-09

## 2021-12-09 LAB
ABO GROUP BLD: NORMAL
BLD GP AB SCN SERPL QL: NEGATIVE
PLATELET # BLD AUTO: 234 10*3/MM3 (ref 140–450)
RH BLD: POSITIVE
T&S EXPIRATION DATE: NORMAL

## 2021-12-09 PROCEDURE — 85049 AUTOMATED PLATELET COUNT: CPT | Performed by: INTERNAL MEDICINE

## 2021-12-09 PROCEDURE — 86850 RBC ANTIBODY SCREEN: CPT | Performed by: INTERNAL MEDICINE

## 2021-12-09 PROCEDURE — P9037 PLATE PHERES LEUKOREDU IRRAD: HCPCS

## 2021-12-09 PROCEDURE — P9100 PATHOGEN TEST FOR PLATELETS: HCPCS

## 2021-12-09 PROCEDURE — 86900 BLOOD TYPING SEROLOGIC ABO: CPT | Performed by: INTERNAL MEDICINE

## 2021-12-09 PROCEDURE — 25010000002 HEPARIN LOCK FLUSH PER 10 UNITS: Performed by: INTERNAL MEDICINE

## 2021-12-09 PROCEDURE — 86901 BLOOD TYPING SEROLOGIC RH(D): CPT | Performed by: INTERNAL MEDICINE

## 2021-12-09 PROCEDURE — 36430 TRANSFUSION BLD/BLD COMPNT: CPT

## 2021-12-09 RX ORDER — HEPARIN SODIUM (PORCINE) LOCK FLUSH IV SOLN 100 UNIT/ML 100 UNIT/ML
400 SOLUTION INTRAVENOUS AS NEEDED
Status: DISCONTINUED | OUTPATIENT
Start: 2021-12-09 | End: 2021-12-11 | Stop reason: HOSPADM

## 2021-12-09 RX ORDER — HEPARIN SODIUM (PORCINE) LOCK FLUSH IV SOLN 100 UNIT/ML 100 UNIT/ML
400 SOLUTION INTRAVENOUS AS NEEDED
Status: CANCELLED | OUTPATIENT
Start: 2021-12-09

## 2021-12-09 RX ORDER — SODIUM CHLORIDE 0.9 % (FLUSH) 0.9 %
10 SYRINGE (ML) INJECTION AS NEEDED
Status: CANCELLED | OUTPATIENT
Start: 2021-12-09

## 2021-12-09 RX ORDER — SODIUM CHLORIDE 0.9 % (FLUSH) 0.9 %
10 SYRINGE (ML) INJECTION AS NEEDED
Status: DISCONTINUED | OUTPATIENT
Start: 2021-12-09 | End: 2021-12-11 | Stop reason: HOSPADM

## 2021-12-09 RX ADMIN — HEPARIN SODIUM (PORCINE) LOCK FLUSH IV SOLN 100 UNIT/ML 400 UNITS: 100 SOLUTION at 10:15

## 2022-01-17 ENCOUNTER — TRANSCRIBE ORDERS (OUTPATIENT)
Dept: ADMINISTRATIVE | Facility: HOSPITAL | Age: 42
End: 2022-01-17

## 2022-01-17 DIAGNOSIS — D69.1 PLATELET DISORDER: Primary | ICD-10-CM

## 2022-01-19 ENCOUNTER — HOSPITAL ENCOUNTER (OUTPATIENT)
Dept: INFUSION THERAPY | Facility: HOSPITAL | Age: 42
Setting detail: INFUSION SERIES
Discharge: HOME | End: 2022-01-19

## 2022-01-20 ENCOUNTER — HOSPITAL ENCOUNTER (OUTPATIENT)
Dept: INFUSION THERAPY | Facility: HOSPITAL | Age: 42
Setting detail: INFUSION SERIES
Discharge: HOME | End: 2022-01-20

## 2022-01-20 VITALS
BODY MASS INDEX: 39.01 KG/M2 | RESPIRATION RATE: 20 BRPM | WEIGHT: 234.13 LBS | OXYGEN SATURATION: 99 % | TEMPERATURE: 97.7 F | HEART RATE: 75 BPM | HEIGHT: 65 IN | SYSTOLIC BLOOD PRESSURE: 111 MMHG | DIASTOLIC BLOOD PRESSURE: 53 MMHG

## 2022-01-20 DIAGNOSIS — Z95.828 PORT-A-CATH IN PLACE: Primary | ICD-10-CM

## 2022-01-20 LAB
ABO GROUP BLD: NORMAL
BLD GP AB SCN SERPL QL: NEGATIVE
PLATELET # BLD AUTO: 256 10*3/MM3 (ref 140–450)
RH BLD: POSITIVE
T&S EXPIRATION DATE: NORMAL

## 2022-01-20 PROCEDURE — 86901 BLOOD TYPING SEROLOGIC RH(D): CPT | Performed by: INTERNAL MEDICINE

## 2022-01-20 PROCEDURE — 36430 TRANSFUSION BLD/BLD COMPNT: CPT

## 2022-01-20 PROCEDURE — 86900 BLOOD TYPING SEROLOGIC ABO: CPT | Performed by: INTERNAL MEDICINE

## 2022-01-20 PROCEDURE — P9037 PLATE PHERES LEUKOREDU IRRAD: HCPCS

## 2022-01-20 PROCEDURE — 85049 AUTOMATED PLATELET COUNT: CPT | Performed by: INTERNAL MEDICINE

## 2022-01-20 PROCEDURE — 86850 RBC ANTIBODY SCREEN: CPT | Performed by: INTERNAL MEDICINE

## 2022-01-20 PROCEDURE — 25010000002 HEPARIN LOCK FLUSH PER 10 UNITS: Performed by: INTERNAL MEDICINE

## 2022-01-20 PROCEDURE — P9100 PATHOGEN TEST FOR PLATELETS: HCPCS

## 2022-01-20 RX ORDER — SODIUM CHLORIDE 0.9 % (FLUSH) 0.9 %
10 SYRINGE (ML) INJECTION AS NEEDED
Status: CANCELLED | OUTPATIENT
Start: 2022-01-20

## 2022-01-20 RX ORDER — HEPARIN SODIUM (PORCINE) LOCK FLUSH IV SOLN 100 UNIT/ML 100 UNIT/ML
400 SOLUTION INTRAVENOUS AS NEEDED
Status: DISCONTINUED | OUTPATIENT
Start: 2022-01-20 | End: 2022-01-22 | Stop reason: HOSPADM

## 2022-01-20 RX ORDER — SODIUM CHLORIDE 0.9 % (FLUSH) 0.9 %
10 SYRINGE (ML) INJECTION AS NEEDED
Status: DISCONTINUED | OUTPATIENT
Start: 2022-01-20 | End: 2022-01-22 | Stop reason: HOSPADM

## 2022-01-20 RX ORDER — HEPARIN SODIUM (PORCINE) LOCK FLUSH IV SOLN 100 UNIT/ML 100 UNIT/ML
400 SOLUTION INTRAVENOUS AS NEEDED
Status: CANCELLED | OUTPATIENT
Start: 2022-01-20

## 2022-01-20 RX ADMIN — HEPARIN SODIUM (PORCINE) LOCK FLUSH IV SOLN 100 UNIT/ML 400 UNITS: 100 SOLUTION at 10:19

## 2022-01-21 ENCOUNTER — APPOINTMENT (OUTPATIENT)
Dept: INFUSION THERAPY | Facility: HOSPITAL | Age: 42
End: 2022-01-21

## 2022-02-28 ENCOUNTER — TRANSCRIBE ORDERS (OUTPATIENT)
Dept: ADMINISTRATIVE | Facility: HOSPITAL | Age: 42
End: 2022-02-28

## 2022-02-28 DIAGNOSIS — D69.1 DYSFUNCTIONAL PLATELETS: Primary | ICD-10-CM

## 2022-03-02 ENCOUNTER — HOSPITAL ENCOUNTER (OUTPATIENT)
Dept: INFUSION THERAPY | Facility: HOSPITAL | Age: 42
Discharge: HOME | End: 2022-03-02
Admitting: INTERNAL MEDICINE

## 2022-03-02 VITALS
TEMPERATURE: 97.9 F | OXYGEN SATURATION: 100 % | HEIGHT: 65 IN | BODY MASS INDEX: 38.31 KG/M2 | SYSTOLIC BLOOD PRESSURE: 124 MMHG | RESPIRATION RATE: 18 BRPM | DIASTOLIC BLOOD PRESSURE: 77 MMHG | WEIGHT: 229.94 LBS | HEART RATE: 70 BPM

## 2022-03-02 DIAGNOSIS — Z95.828 PORT-A-CATH IN PLACE: Primary | ICD-10-CM

## 2022-03-02 PROCEDURE — P9037 PLATE PHERES LEUKOREDU IRRAD: HCPCS

## 2022-03-02 PROCEDURE — 86850 RBC ANTIBODY SCREEN: CPT | Performed by: INTERNAL MEDICINE

## 2022-03-02 PROCEDURE — P9035 PLATELET PHERES LEUKOREDUCED: HCPCS

## 2022-03-02 PROCEDURE — 86901 BLOOD TYPING SEROLOGIC RH(D): CPT | Performed by: INTERNAL MEDICINE

## 2022-03-02 PROCEDURE — P9100 PATHOGEN TEST FOR PLATELETS: HCPCS

## 2022-03-02 PROCEDURE — 36430 TRANSFUSION BLD/BLD COMPNT: CPT

## 2022-03-02 PROCEDURE — 86900 BLOOD TYPING SEROLOGIC ABO: CPT | Performed by: INTERNAL MEDICINE

## 2022-03-02 PROCEDURE — 25010000002 HEPARIN LOCK FLUSH PER 10 UNITS: Performed by: INTERNAL MEDICINE

## 2022-03-02 RX ORDER — HEPARIN SODIUM (PORCINE) LOCK FLUSH IV SOLN 100 UNIT/ML 100 UNIT/ML
400 SOLUTION INTRAVENOUS AS NEEDED
Status: DISCONTINUED | OUTPATIENT
Start: 2022-03-02 | End: 2022-03-04 | Stop reason: HOSPADM

## 2022-03-02 RX ORDER — SODIUM CHLORIDE 0.9 % (FLUSH) 0.9 %
10 SYRINGE (ML) INJECTION AS NEEDED
Status: CANCELLED | OUTPATIENT
Start: 2022-03-02

## 2022-03-02 RX ORDER — SODIUM CHLORIDE 0.9 % (FLUSH) 0.9 %
10 SYRINGE (ML) INJECTION AS NEEDED
Status: DISCONTINUED | OUTPATIENT
Start: 2022-03-02 | End: 2022-03-04 | Stop reason: HOSPADM

## 2022-03-02 RX ORDER — HEPARIN SODIUM (PORCINE) LOCK FLUSH IV SOLN 100 UNIT/ML 100 UNIT/ML
400 SOLUTION INTRAVENOUS AS NEEDED
Status: CANCELLED | OUTPATIENT
Start: 2022-03-02

## 2022-03-02 RX ADMIN — HEPARIN SODIUM (PORCINE) LOCK FLUSH IV SOLN 100 UNIT/ML 400 UNITS: 100 SOLUTION at 12:36

## 2022-03-28 RX ORDER — ESTRADIOL 1 MG/1
1 TABLET ORAL DAILY
Qty: 90 TABLET | Refills: 3 | Status: SHIPPED | OUTPATIENT
Start: 2022-03-28 | End: 2022-06-26

## 2022-04-18 ENCOUNTER — TRANSCRIBE ORDERS (OUTPATIENT)
Dept: ADMINISTRATIVE | Facility: HOSPITAL | Age: 42
End: 2022-04-18

## 2022-04-18 DIAGNOSIS — D69.1 PLATELET DISORDER: Primary | ICD-10-CM

## 2022-04-21 ENCOUNTER — HOSPITAL ENCOUNTER (OUTPATIENT)
Dept: INFUSION THERAPY | Facility: HOSPITAL | Age: 42
Discharge: HOME | End: 2022-04-21
Admitting: INTERNAL MEDICINE

## 2022-04-21 VITALS
OXYGEN SATURATION: 100 % | HEIGHT: 65 IN | WEIGHT: 233.91 LBS | TEMPERATURE: 98 F | DIASTOLIC BLOOD PRESSURE: 76 MMHG | HEART RATE: 80 BPM | SYSTOLIC BLOOD PRESSURE: 119 MMHG | RESPIRATION RATE: 18 BRPM | BODY MASS INDEX: 38.97 KG/M2

## 2022-04-21 DIAGNOSIS — Z95.828 PORT-A-CATH IN PLACE: Primary | ICD-10-CM

## 2022-04-21 PROCEDURE — 86850 RBC ANTIBODY SCREEN: CPT | Performed by: INTERNAL MEDICINE

## 2022-04-21 PROCEDURE — P9100 PATHOGEN TEST FOR PLATELETS: HCPCS

## 2022-04-21 PROCEDURE — 36591 DRAW BLOOD OFF VENOUS DEVICE: CPT

## 2022-04-21 PROCEDURE — 86900 BLOOD TYPING SEROLOGIC ABO: CPT | Performed by: INTERNAL MEDICINE

## 2022-04-21 PROCEDURE — 36430 TRANSFUSION BLD/BLD COMPNT: CPT

## 2022-04-21 PROCEDURE — 25010000002 HEPARIN LOCK FLUSH PER 10 UNITS: Performed by: INTERNAL MEDICINE

## 2022-04-21 PROCEDURE — P9037 PLATE PHERES LEUKOREDU IRRAD: HCPCS

## 2022-04-21 PROCEDURE — 86901 BLOOD TYPING SEROLOGIC RH(D): CPT | Performed by: INTERNAL MEDICINE

## 2022-04-21 RX ORDER — SODIUM CHLORIDE 0.9 % (FLUSH) 0.9 %
10 SYRINGE (ML) INJECTION AS NEEDED
Status: CANCELLED | OUTPATIENT
Start: 2022-04-21

## 2022-04-21 RX ORDER — SODIUM CHLORIDE 9 MG/ML
250 INJECTION, SOLUTION INTRAVENOUS AS NEEDED
Status: DISCONTINUED | OUTPATIENT
Start: 2022-04-21 | End: 2022-04-23 | Stop reason: HOSPADM

## 2022-04-21 RX ORDER — HEPARIN SODIUM (PORCINE) LOCK FLUSH IV SOLN 100 UNIT/ML 100 UNIT/ML
400 SOLUTION INTRAVENOUS AS NEEDED
Status: DISCONTINUED | OUTPATIENT
Start: 2022-04-21 | End: 2022-04-23 | Stop reason: HOSPADM

## 2022-04-21 RX ORDER — SODIUM CHLORIDE 0.9 % (FLUSH) 0.9 %
10 SYRINGE (ML) INJECTION AS NEEDED
Status: DISCONTINUED | OUTPATIENT
Start: 2022-04-21 | End: 2022-04-23 | Stop reason: HOSPADM

## 2022-04-21 RX ORDER — HEPARIN SODIUM (PORCINE) LOCK FLUSH IV SOLN 100 UNIT/ML 100 UNIT/ML
400 SOLUTION INTRAVENOUS AS NEEDED
Status: CANCELLED | OUTPATIENT
Start: 2022-04-21

## 2022-04-21 RX ADMIN — HEPARIN SODIUM (PORCINE) LOCK FLUSH IV SOLN 100 UNIT/ML 400 UNITS: 100 SOLUTION at 15:38

## 2022-04-21 NOTE — ADDENDUM NOTE
Encounter addended by: Rianna Majano RN on: 4/21/2022 2:29 PM   Actions taken: Visit diagnoses modified, Diagnosis association updated, Order list changed

## 2022-04-21 NOTE — ADDENDUM NOTE
Encounter addended by: Rianna Majano RN on: 4/21/2022 3:14 PM   Actions taken: MAR administration accepted

## 2022-04-21 NOTE — ADDENDUM NOTE
Encounter addended by: Rianna Majano RN on: 4/21/2022 3:44 PM   Actions taken: MAR administration accepted, Flowsheet accepted

## 2022-04-21 NOTE — ADDENDUM NOTE
Encounter addended by: Rianna Majano RN on: 4/21/2022 2:55 PM   Actions taken: Child order released for a procedure order, Pharmacy for encounter modified, Flowsheet accepted

## 2022-05-23 ENCOUNTER — TRANSCRIBE ORDERS (OUTPATIENT)
Dept: ADMINISTRATIVE | Facility: HOSPITAL | Age: 42
End: 2022-05-23

## 2022-05-23 DIAGNOSIS — D69.1 PLATELET DISORDER: Primary | ICD-10-CM

## 2022-05-25 ENCOUNTER — HOSPITAL ENCOUNTER (OUTPATIENT)
Dept: INFUSION THERAPY | Facility: HOSPITAL | Age: 42
Setting detail: INFUSION SERIES
Discharge: HOME | End: 2022-05-25

## 2022-05-25 VITALS
HEART RATE: 68 BPM | RESPIRATION RATE: 18 BRPM | OXYGEN SATURATION: 100 % | HEIGHT: 65 IN | DIASTOLIC BLOOD PRESSURE: 87 MMHG | WEIGHT: 225.75 LBS | TEMPERATURE: 98.6 F | SYSTOLIC BLOOD PRESSURE: 123 MMHG | BODY MASS INDEX: 37.61 KG/M2

## 2022-05-25 DIAGNOSIS — Z95.828 PORT-A-CATH IN PLACE: ICD-10-CM

## 2022-05-25 DIAGNOSIS — D69.1 PLATELET DYSFUNCTION: Primary | ICD-10-CM

## 2022-05-25 PROCEDURE — 25010000002 HEPARIN LOCK FLUSH PER 10 UNITS: Performed by: INTERNAL MEDICINE

## 2022-05-25 PROCEDURE — 36591 DRAW BLOOD OFF VENOUS DEVICE: CPT

## 2022-05-25 PROCEDURE — P9100 PATHOGEN TEST FOR PLATELETS: HCPCS

## 2022-05-25 PROCEDURE — 36430 TRANSFUSION BLD/BLD COMPNT: CPT

## 2022-05-25 PROCEDURE — 86901 BLOOD TYPING SEROLOGIC RH(D): CPT | Performed by: INTERNAL MEDICINE

## 2022-05-25 PROCEDURE — 86850 RBC ANTIBODY SCREEN: CPT | Performed by: INTERNAL MEDICINE

## 2022-05-25 PROCEDURE — 86900 BLOOD TYPING SEROLOGIC ABO: CPT | Performed by: INTERNAL MEDICINE

## 2022-05-25 PROCEDURE — P9037 PLATE PHERES LEUKOREDU IRRAD: HCPCS

## 2022-05-25 RX ORDER — SODIUM CHLORIDE 9 MG/ML
250 INJECTION, SOLUTION INTRAVENOUS AS NEEDED
Status: CANCELLED | OUTPATIENT
Start: 2022-05-25

## 2022-05-25 RX ORDER — SODIUM CHLORIDE 0.9 % (FLUSH) 0.9 %
10 SYRINGE (ML) INJECTION AS NEEDED
Status: CANCELLED | OUTPATIENT
Start: 2022-05-25

## 2022-05-25 RX ORDER — SODIUM CHLORIDE 0.9 % (FLUSH) 0.9 %
10 SYRINGE (ML) INJECTION AS NEEDED
Status: DISCONTINUED | OUTPATIENT
Start: 2022-05-25 | End: 2022-05-27 | Stop reason: HOSPADM

## 2022-05-25 RX ORDER — HEPARIN SODIUM (PORCINE) LOCK FLUSH IV SOLN 100 UNIT/ML 100 UNIT/ML
400 SOLUTION INTRAVENOUS AS NEEDED
Status: CANCELLED | OUTPATIENT
Start: 2022-05-25

## 2022-05-25 RX ORDER — HEPARIN SODIUM (PORCINE) LOCK FLUSH IV SOLN 100 UNIT/ML 100 UNIT/ML
400 SOLUTION INTRAVENOUS AS NEEDED
Status: DISCONTINUED | OUTPATIENT
Start: 2022-05-25 | End: 2022-05-27 | Stop reason: HOSPADM

## 2022-05-25 RX ADMIN — HEPARIN SODIUM (PORCINE) LOCK FLUSH IV SOLN 100 UNIT/ML 400 UNITS: 100 SOLUTION at 15:03

## 2022-07-06 ENCOUNTER — TRANSCRIBE ORDERS (OUTPATIENT)
Dept: ADMINISTRATIVE | Facility: HOSPITAL | Age: 42
End: 2022-07-06

## 2022-07-06 DIAGNOSIS — D69.1 PLATELET DISORDER: Primary | ICD-10-CM

## 2022-07-08 ENCOUNTER — HOSPITAL ENCOUNTER (OUTPATIENT)
Dept: INFUSION THERAPY | Facility: HOSPITAL | Age: 42
Setting detail: INFUSION SERIES
Discharge: HOME | End: 2022-07-08

## 2022-07-08 VITALS
OXYGEN SATURATION: 99 % | HEART RATE: 68 BPM | BODY MASS INDEX: 37.1 KG/M2 | HEIGHT: 65 IN | WEIGHT: 222.66 LBS | SYSTOLIC BLOOD PRESSURE: 128 MMHG | DIASTOLIC BLOOD PRESSURE: 78 MMHG | TEMPERATURE: 97.7 F | RESPIRATION RATE: 20 BRPM

## 2022-07-08 DIAGNOSIS — Z95.828 PORT-A-CATH IN PLACE: Primary | ICD-10-CM

## 2022-07-08 PROCEDURE — P9035 PLATELET PHERES LEUKOREDUCED: HCPCS

## 2022-07-08 PROCEDURE — P9037 PLATE PHERES LEUKOREDU IRRAD: HCPCS

## 2022-07-08 PROCEDURE — 25010000002 HEPARIN LOCK FLUSH PER 10 UNITS: Performed by: INTERNAL MEDICINE

## 2022-07-08 PROCEDURE — P9100 PATHOGEN TEST FOR PLATELETS: HCPCS

## 2022-07-08 PROCEDURE — 86901 BLOOD TYPING SEROLOGIC RH(D): CPT | Performed by: INTERNAL MEDICINE

## 2022-07-08 PROCEDURE — 36430 TRANSFUSION BLD/BLD COMPNT: CPT

## 2022-07-08 PROCEDURE — 86900 BLOOD TYPING SEROLOGIC ABO: CPT | Performed by: INTERNAL MEDICINE

## 2022-07-08 PROCEDURE — 86850 RBC ANTIBODY SCREEN: CPT | Performed by: INTERNAL MEDICINE

## 2022-07-08 RX ORDER — HEPARIN SODIUM (PORCINE) LOCK FLUSH IV SOLN 100 UNIT/ML 100 UNIT/ML
500 SOLUTION INTRAVENOUS AS NEEDED
Status: DISCONTINUED | OUTPATIENT
Start: 2022-07-08 | End: 2022-07-10 | Stop reason: HOSPADM

## 2022-07-08 RX ORDER — HEPARIN SODIUM (PORCINE) LOCK FLUSH IV SOLN 100 UNIT/ML 100 UNIT/ML
500 SOLUTION INTRAVENOUS AS NEEDED
Status: CANCELLED | OUTPATIENT
Start: 2022-07-08

## 2022-07-08 RX ADMIN — HEPARIN 500 UNITS: 100 SYRINGE at 09:55

## 2022-08-08 ENCOUNTER — TRANSCRIBE ORDERS (OUTPATIENT)
Dept: ADMINISTRATIVE | Facility: HOSPITAL | Age: 42
End: 2022-08-08

## 2022-08-08 DIAGNOSIS — D69.1: Primary | ICD-10-CM

## 2022-08-10 ENCOUNTER — HOSPITAL ENCOUNTER (OUTPATIENT)
Dept: INFUSION THERAPY | Facility: HOSPITAL | Age: 42
Setting detail: INFUSION SERIES
Discharge: HOME | End: 2022-08-10

## 2022-08-10 VITALS
HEART RATE: 67 BPM | DIASTOLIC BLOOD PRESSURE: 83 MMHG | RESPIRATION RATE: 18 BRPM | BODY MASS INDEX: 37.02 KG/M2 | TEMPERATURE: 97.8 F | WEIGHT: 222.22 LBS | OXYGEN SATURATION: 100 % | SYSTOLIC BLOOD PRESSURE: 123 MMHG | HEIGHT: 65 IN

## 2022-08-10 DIAGNOSIS — Z95.828 PORT-A-CATH IN PLACE: Primary | ICD-10-CM

## 2022-08-10 LAB
ABO GROUP BLD: NORMAL
RH BLD: POSITIVE

## 2022-08-10 PROCEDURE — 86901 BLOOD TYPING SEROLOGIC RH(D): CPT | Performed by: INTERNAL MEDICINE

## 2022-08-10 PROCEDURE — P9035 PLATELET PHERES LEUKOREDUCED: HCPCS

## 2022-08-10 PROCEDURE — 36430 TRANSFUSION BLD/BLD COMPNT: CPT

## 2022-08-10 PROCEDURE — 86900 BLOOD TYPING SEROLOGIC ABO: CPT | Performed by: INTERNAL MEDICINE

## 2022-08-10 PROCEDURE — 36591 DRAW BLOOD OFF VENOUS DEVICE: CPT

## 2022-08-10 PROCEDURE — P9037 PLATE PHERES LEUKOREDU IRRAD: HCPCS

## 2022-08-10 PROCEDURE — 25010000002 HEPARIN LOCK FLUSH PER 10 UNITS: Performed by: INTERNAL MEDICINE

## 2022-08-10 PROCEDURE — P9100 PATHOGEN TEST FOR PLATELETS: HCPCS

## 2022-08-10 RX ORDER — SODIUM CHLORIDE 9 MG/ML
250 INJECTION, SOLUTION INTRAVENOUS AS NEEDED
Status: DISCONTINUED | OUTPATIENT
Start: 2022-08-10 | End: 2022-08-12 | Stop reason: HOSPADM

## 2022-08-10 RX ORDER — SODIUM CHLORIDE 9 MG/ML
250 INJECTION, SOLUTION INTRAVENOUS AS NEEDED
Status: CANCELLED | OUTPATIENT
Start: 2022-09-13

## 2022-08-10 RX ORDER — HEPARIN SODIUM (PORCINE) LOCK FLUSH IV SOLN 100 UNIT/ML 100 UNIT/ML
500 SOLUTION INTRAVENOUS AS NEEDED
Status: CANCELLED | OUTPATIENT
Start: 2022-08-10

## 2022-08-10 RX ORDER — HEPARIN SODIUM (PORCINE) LOCK FLUSH IV SOLN 100 UNIT/ML 100 UNIT/ML
500 SOLUTION INTRAVENOUS AS NEEDED
Status: DISCONTINUED | OUTPATIENT
Start: 2022-08-10 | End: 2022-08-12 | Stop reason: HOSPADM

## 2022-08-10 RX ADMIN — HEPARIN 500 UNITS: 100 SYRINGE at 10:11

## 2022-08-24 ENCOUNTER — OFFICE VISIT (OUTPATIENT)
Dept: OBSTETRICS AND GYNECOLOGY | Facility: CLINIC | Age: 42
End: 2022-08-24

## 2022-08-24 VITALS
HEART RATE: 89 BPM | SYSTOLIC BLOOD PRESSURE: 117 MMHG | HEIGHT: 65 IN | DIASTOLIC BLOOD PRESSURE: 78 MMHG | WEIGHT: 213 LBS | BODY MASS INDEX: 35.49 KG/M2

## 2022-08-24 DIAGNOSIS — I87.8 PELVIC PHLEBOLITHIASIS: Primary | ICD-10-CM

## 2022-08-24 PROCEDURE — 99213 OFFICE O/P EST LOW 20 MIN: CPT | Performed by: OBSTETRICS & GYNECOLOGY

## 2022-08-24 NOTE — PROGRESS NOTES
"GYN Problem/Follow Up Visit    Chief Complaint   Patient presents with   • VAG BUMP           HPI  Dahliajewel Rox Whitaker is a 42 y.o. female, , who presents for evaluation of vaginal bump..     Patient noticed about 1 month ago.  BB sized.  Along the bottom vaginal wall.  No drainage or bleeding    Additional OB/GYN History   No LMP recorded. Patient has had a hysterectomy.    Allergies : Adhesive tape and Morphine     The additional following portions of the patient's history were reviewed and updated as appropriate: allergies, current medications, past family history, past medical history, past social history, past surgical history and problem list.    Review of Systems    I have reviewed and agree with the HPI, ROS, and historical information as entered above. Tree Espinal Sr., MD    Objective   /78   Pulse 89   Ht 165.1 cm (65\")   Wt 96.6 kg (213 lb)   BMI 35.45 kg/m²     Physical Exam  Alert and oriented x4  Approximately 3 to 4 mm nodule palpable 1-1/2 cm inside the hymen in the rectovaginal septum.  Firm nodule nontender.  Suspect phlebolith.    Assessment and Plan    Diagnoses and all orders for this visit:    1. Pelvic phlebolithiasis (Primary)    Recommend observation.      Follow Up:  No follow-ups on file.        Tree Espinal Sr., MD  2022  "

## 2022-09-08 ENCOUNTER — TRANSCRIBE ORDERS (OUTPATIENT)
Dept: ADMINISTRATIVE | Facility: HOSPITAL | Age: 42
End: 2022-09-08

## 2022-09-08 DIAGNOSIS — D69.1 QUALITATIVE PLATELET DEFECTS: Primary | ICD-10-CM

## 2022-09-13 ENCOUNTER — HOSPITAL ENCOUNTER (OUTPATIENT)
Dept: INFUSION THERAPY | Facility: HOSPITAL | Age: 42
Setting detail: INFUSION SERIES
Discharge: HOME | End: 2022-09-13

## 2022-09-13 VITALS
RESPIRATION RATE: 16 BRPM | WEIGHT: 215.39 LBS | BODY MASS INDEX: 35.89 KG/M2 | SYSTOLIC BLOOD PRESSURE: 112 MMHG | DIASTOLIC BLOOD PRESSURE: 61 MMHG | HEART RATE: 60 BPM | OXYGEN SATURATION: 100 % | HEIGHT: 65 IN | TEMPERATURE: 97.7 F

## 2022-09-13 DIAGNOSIS — Z95.828 PORT-A-CATH IN PLACE: ICD-10-CM

## 2022-09-13 DIAGNOSIS — D69.1 QUALITATIVE PLATELET DISORDER: Primary | ICD-10-CM

## 2022-09-13 LAB
ABO GROUP BLD: NORMAL
RH BLD: POSITIVE

## 2022-09-13 PROCEDURE — 86901 BLOOD TYPING SEROLOGIC RH(D): CPT | Performed by: INTERNAL MEDICINE

## 2022-09-13 PROCEDURE — P9035 PLATELET PHERES LEUKOREDUCED: HCPCS

## 2022-09-13 PROCEDURE — 36430 TRANSFUSION BLD/BLD COMPNT: CPT

## 2022-09-13 PROCEDURE — P9037 PLATE PHERES LEUKOREDU IRRAD: HCPCS

## 2022-09-13 PROCEDURE — 86900 BLOOD TYPING SEROLOGIC ABO: CPT | Performed by: INTERNAL MEDICINE

## 2022-09-13 PROCEDURE — 25010000002 HEPARIN LOCK FLUSH PER 10 UNITS: Performed by: INTERNAL MEDICINE

## 2022-09-13 PROCEDURE — P9100 PATHOGEN TEST FOR PLATELETS: HCPCS

## 2022-09-13 RX ORDER — HEPARIN SODIUM (PORCINE) LOCK FLUSH IV SOLN 100 UNIT/ML 100 UNIT/ML
500 SOLUTION INTRAVENOUS AS NEEDED
Status: DISCONTINUED | OUTPATIENT
Start: 2022-09-13 | End: 2022-09-15 | Stop reason: HOSPADM

## 2022-09-13 RX ORDER — HEPARIN SODIUM (PORCINE) LOCK FLUSH IV SOLN 100 UNIT/ML 100 UNIT/ML
500 SOLUTION INTRAVENOUS AS NEEDED
Status: CANCELLED | OUTPATIENT
Start: 2022-09-13

## 2022-09-13 RX ADMIN — HEPARIN 500 UNITS: 100 SYRINGE at 10:56

## 2022-10-11 ENCOUNTER — TRANSCRIBE ORDERS (OUTPATIENT)
Dept: ADMINISTRATIVE | Facility: HOSPITAL | Age: 42
End: 2022-10-11

## 2022-10-11 DIAGNOSIS — D69.1 DYSFUNCTIONAL PLATELETS: Primary | ICD-10-CM

## 2022-10-12 ENCOUNTER — HOSPITAL ENCOUNTER (OUTPATIENT)
Dept: INFUSION THERAPY | Facility: HOSPITAL | Age: 42
Setting detail: INFUSION SERIES
Discharge: HOME | End: 2022-10-12

## 2022-10-12 ENCOUNTER — APPOINTMENT (OUTPATIENT)
Dept: INFUSION THERAPY | Facility: HOSPITAL | Age: 42
End: 2022-10-12

## 2022-10-12 VITALS
HEART RATE: 82 BPM | OXYGEN SATURATION: 100 % | BODY MASS INDEX: 35.08 KG/M2 | WEIGHT: 210.54 LBS | RESPIRATION RATE: 20 BRPM | HEIGHT: 65 IN | TEMPERATURE: 98.1 F | SYSTOLIC BLOOD PRESSURE: 113 MMHG | DIASTOLIC BLOOD PRESSURE: 68 MMHG

## 2022-10-12 DIAGNOSIS — Z95.828 PORT-A-CATH IN PLACE: Primary | ICD-10-CM

## 2022-10-12 LAB
ABO GROUP BLD: NORMAL
BLD GP AB SCN SERPL QL: NEGATIVE
PLATELET # BLD AUTO: 231 10*3/MM3 (ref 140–450)
RH BLD: POSITIVE
T&S EXPIRATION DATE: NORMAL

## 2022-10-12 PROCEDURE — 36430 TRANSFUSION BLD/BLD COMPNT: CPT

## 2022-10-12 PROCEDURE — P9035 PLATELET PHERES LEUKOREDUCED: HCPCS

## 2022-10-12 PROCEDURE — P9037 PLATE PHERES LEUKOREDU IRRAD: HCPCS

## 2022-10-12 PROCEDURE — 25010000002 HEPARIN LOCK FLUSH PER 10 UNITS: Performed by: INTERNAL MEDICINE

## 2022-10-12 PROCEDURE — 86901 BLOOD TYPING SEROLOGIC RH(D): CPT | Performed by: INTERNAL MEDICINE

## 2022-10-12 PROCEDURE — 85049 AUTOMATED PLATELET COUNT: CPT | Performed by: NURSE PRACTITIONER

## 2022-10-12 PROCEDURE — 86900 BLOOD TYPING SEROLOGIC ABO: CPT | Performed by: INTERNAL MEDICINE

## 2022-10-12 PROCEDURE — P9100 PATHOGEN TEST FOR PLATELETS: HCPCS

## 2022-10-12 PROCEDURE — 86850 RBC ANTIBODY SCREEN: CPT | Performed by: INTERNAL MEDICINE

## 2022-10-12 RX ORDER — HEPARIN SODIUM (PORCINE) LOCK FLUSH IV SOLN 100 UNIT/ML 100 UNIT/ML
500 SOLUTION INTRAVENOUS AS NEEDED
Status: CANCELLED | OUTPATIENT
Start: 2022-10-12

## 2022-10-12 RX ORDER — HEPARIN SODIUM (PORCINE) LOCK FLUSH IV SOLN 100 UNIT/ML 100 UNIT/ML
500 SOLUTION INTRAVENOUS AS NEEDED
Status: DISCONTINUED | OUTPATIENT
Start: 2022-10-12 | End: 2022-10-14 | Stop reason: HOSPADM

## 2022-10-12 RX ADMIN — HEPARIN 500 UNITS: 100 SYRINGE at 11:25

## 2022-11-21 ENCOUNTER — TRANSCRIBE ORDERS (OUTPATIENT)
Dept: ADMINISTRATIVE | Facility: HOSPITAL | Age: 42
End: 2022-11-21

## 2022-11-21 DIAGNOSIS — D69.1 PLATELET DISORDER: Primary | ICD-10-CM

## 2022-11-25 ENCOUNTER — HOSPITAL ENCOUNTER (OUTPATIENT)
Dept: INFUSION THERAPY | Facility: HOSPITAL | Age: 42
Setting detail: INFUSION SERIES
Discharge: HOME | End: 2022-11-25

## 2022-11-25 VITALS
TEMPERATURE: 98.3 F | BODY MASS INDEX: 34.34 KG/M2 | OXYGEN SATURATION: 100 % | RESPIRATION RATE: 18 BRPM | HEART RATE: 66 BPM | SYSTOLIC BLOOD PRESSURE: 93 MMHG | HEIGHT: 65 IN | DIASTOLIC BLOOD PRESSURE: 79 MMHG | WEIGHT: 206.13 LBS

## 2022-11-25 DIAGNOSIS — Z95.828 PORT-A-CATH IN PLACE: Primary | ICD-10-CM

## 2022-11-25 PROCEDURE — P9100 PATHOGEN TEST FOR PLATELETS: HCPCS

## 2022-11-25 PROCEDURE — P9035 PLATELET PHERES LEUKOREDUCED: HCPCS

## 2022-11-25 PROCEDURE — 36430 TRANSFUSION BLD/BLD COMPNT: CPT

## 2022-11-25 PROCEDURE — P9037 PLATE PHERES LEUKOREDU IRRAD: HCPCS

## 2022-11-25 PROCEDURE — 86850 RBC ANTIBODY SCREEN: CPT | Performed by: INTERNAL MEDICINE

## 2022-11-25 PROCEDURE — 86901 BLOOD TYPING SEROLOGIC RH(D): CPT | Performed by: INTERNAL MEDICINE

## 2022-11-25 PROCEDURE — 86900 BLOOD TYPING SEROLOGIC ABO: CPT | Performed by: INTERNAL MEDICINE

## 2022-11-25 PROCEDURE — 25010000002 HEPARIN LOCK FLUSH PER 10 UNITS: Performed by: INTERNAL MEDICINE

## 2022-11-25 RX ORDER — HEPARIN SODIUM (PORCINE) LOCK FLUSH IV SOLN 100 UNIT/ML 100 UNIT/ML
500 SOLUTION INTRAVENOUS AS NEEDED
Status: DISCONTINUED | OUTPATIENT
Start: 2022-11-25 | End: 2022-11-27 | Stop reason: HOSPADM

## 2022-11-25 RX ORDER — HEPARIN SODIUM (PORCINE) LOCK FLUSH IV SOLN 100 UNIT/ML 100 UNIT/ML
500 SOLUTION INTRAVENOUS AS NEEDED
Status: CANCELLED | OUTPATIENT
Start: 2022-11-25

## 2022-11-25 RX ADMIN — HEPARIN 500 UNITS: 100 SYRINGE at 11:24

## 2023-01-03 ENCOUNTER — TRANSCRIBE ORDERS (OUTPATIENT)
Dept: ADMINISTRATIVE | Facility: HOSPITAL | Age: 43
End: 2023-01-03
Payer: MEDICARE

## 2023-01-03 DIAGNOSIS — D69.1 DYSFUNCTIONAL PLATELETS: Primary | ICD-10-CM

## 2023-01-04 ENCOUNTER — HOSPITAL ENCOUNTER (OUTPATIENT)
Dept: INFUSION THERAPY | Facility: HOSPITAL | Age: 43
Discharge: HOME | End: 2023-01-04
Payer: MEDICARE

## 2023-01-04 VITALS
SYSTOLIC BLOOD PRESSURE: 116 MMHG | DIASTOLIC BLOOD PRESSURE: 64 MMHG | HEIGHT: 65 IN | OXYGEN SATURATION: 99 % | HEART RATE: 66 BPM | TEMPERATURE: 98 F | RESPIRATION RATE: 18 BRPM | WEIGHT: 208.78 LBS | BODY MASS INDEX: 34.78 KG/M2

## 2023-01-04 DIAGNOSIS — Z95.828 PORT-A-CATH IN PLACE: Primary | ICD-10-CM

## 2023-01-04 LAB
ABO GROUP BLD: NORMAL
BASOPHILS # BLD AUTO: 0.03 10*3/MM3 (ref 0–0.2)
BASOPHILS NFR BLD AUTO: 0.5 % (ref 0–1.5)
BLD GP AB SCN SERPL QL: NEGATIVE
DEPRECATED RDW RBC AUTO: 42.1 FL (ref 37–54)
EOSINOPHIL # BLD AUTO: 0.09 10*3/MM3 (ref 0–0.4)
EOSINOPHIL NFR BLD AUTO: 1.4 % (ref 0.3–6.2)
ERYTHROCYTE [DISTWIDTH] IN BLOOD BY AUTOMATED COUNT: 13 % (ref 12.3–15.4)
HCT VFR BLD AUTO: 38 % (ref 34–46.6)
HGB BLD-MCNC: 13.2 G/DL (ref 12–15.9)
IMM GRANULOCYTES # BLD AUTO: 0.01 10*3/MM3 (ref 0–0.05)
IMM GRANULOCYTES NFR BLD AUTO: 0.2 % (ref 0–0.5)
LYMPHOCYTES # BLD AUTO: 1.97 10*3/MM3 (ref 0.7–3.1)
LYMPHOCYTES NFR BLD AUTO: 31.2 % (ref 19.6–45.3)
MCH RBC QN AUTO: 30.9 PG (ref 26.6–33)
MCHC RBC AUTO-ENTMCNC: 34.7 G/DL (ref 31.5–35.7)
MCV RBC AUTO: 89 FL (ref 79–97)
MONOCYTES # BLD AUTO: 0.29 10*3/MM3 (ref 0.1–0.9)
MONOCYTES NFR BLD AUTO: 4.6 % (ref 5–12)
NEUTROPHILS NFR BLD AUTO: 3.93 10*3/MM3 (ref 1.7–7)
NEUTROPHILS NFR BLD AUTO: 62.1 % (ref 42.7–76)
NRBC BLD AUTO-RTO: 0 /100 WBC (ref 0–0.2)
PLATELET # BLD AUTO: 255 10*3/MM3 (ref 140–450)
PMV BLD AUTO: 8.9 FL (ref 6–12)
RBC # BLD AUTO: 4.27 10*6/MM3 (ref 3.77–5.28)
RH BLD: POSITIVE
T&S EXPIRATION DATE: NORMAL
WBC NRBC COR # BLD: 6.32 10*3/MM3 (ref 3.4–10.8)

## 2023-01-04 PROCEDURE — P9037 PLATE PHERES LEUKOREDU IRRAD: HCPCS

## 2023-01-04 PROCEDURE — 25010000002 HEPARIN LOCK FLUSH PER 10 UNITS: Performed by: INTERNAL MEDICINE

## 2023-01-04 PROCEDURE — 36430 TRANSFUSION BLD/BLD COMPNT: CPT

## 2023-01-04 PROCEDURE — 86850 RBC ANTIBODY SCREEN: CPT | Performed by: INTERNAL MEDICINE

## 2023-01-04 PROCEDURE — 86900 BLOOD TYPING SEROLOGIC ABO: CPT | Performed by: INTERNAL MEDICINE

## 2023-01-04 PROCEDURE — 86901 BLOOD TYPING SEROLOGIC RH(D): CPT | Performed by: INTERNAL MEDICINE

## 2023-01-04 PROCEDURE — 85025 COMPLETE CBC W/AUTO DIFF WBC: CPT | Performed by: INTERNAL MEDICINE

## 2023-01-04 PROCEDURE — P9035 PLATELET PHERES LEUKOREDUCED: HCPCS

## 2023-01-04 PROCEDURE — P9100 PATHOGEN TEST FOR PLATELETS: HCPCS

## 2023-01-04 RX ORDER — LEVOTHYROXINE SODIUM 0.03 MG/1
25 TABLET ORAL DAILY
COMMUNITY
Start: 2022-10-17

## 2023-01-04 RX ORDER — HEPARIN SODIUM (PORCINE) LOCK FLUSH IV SOLN 100 UNIT/ML 100 UNIT/ML
500 SOLUTION INTRAVENOUS AS NEEDED
Status: DISCONTINUED | OUTPATIENT
Start: 2023-01-04 | End: 2023-01-06 | Stop reason: HOSPADM

## 2023-01-04 RX ORDER — HEPARIN SODIUM (PORCINE) LOCK FLUSH IV SOLN 100 UNIT/ML 100 UNIT/ML
500 SOLUTION INTRAVENOUS AS NEEDED
Status: CANCELLED | OUTPATIENT
Start: 2023-01-04

## 2023-01-04 RX ORDER — PHENTERMINE HYDROCHLORIDE 37.5 MG/1
37.5 TABLET ORAL DAILY
COMMUNITY
Start: 2022-10-17

## 2023-01-04 RX ADMIN — HEPARIN 500 UNITS: 100 SYRINGE at 10:49

## 2023-01-05 LAB
BH BB BLOOD EXPIRATION DATE: NORMAL
BH BB BLOOD TYPE BARCODE: 6200
BH BB DISPENSE STATUS: NORMAL
BH BB PRODUCT CODE: NORMAL
BH BB UNIT NUMBER: NORMAL
UNIT  ABO: NORMAL
UNIT  RH: NORMAL

## 2023-02-06 ENCOUNTER — TRANSCRIBE ORDERS (OUTPATIENT)
Dept: ADMINISTRATIVE | Facility: HOSPITAL | Age: 43
End: 2023-02-06
Payer: MEDICARE

## 2023-02-06 ENCOUNTER — PREP FOR SURGERY (OUTPATIENT)
Dept: OTHER | Facility: HOSPITAL | Age: 43
End: 2023-02-06

## 2023-02-06 DIAGNOSIS — D69.1 PLATELET DYSFUNCTION: Primary | ICD-10-CM

## 2023-02-15 ENCOUNTER — HOSPITAL ENCOUNTER (OUTPATIENT)
Dept: INFUSION THERAPY | Facility: HOSPITAL | Age: 43
Discharge: HOME | End: 2023-02-15
Admitting: INTERNAL MEDICINE
Payer: MEDICARE

## 2023-02-15 VITALS
SYSTOLIC BLOOD PRESSURE: 104 MMHG | OXYGEN SATURATION: 98 % | BODY MASS INDEX: 32.51 KG/M2 | RESPIRATION RATE: 18 BRPM | WEIGHT: 195.11 LBS | TEMPERATURE: 98 F | DIASTOLIC BLOOD PRESSURE: 64 MMHG | HEART RATE: 63 BPM | HEIGHT: 65 IN

## 2023-02-15 DIAGNOSIS — Z95.828 PORT-A-CATH IN PLACE: Primary | ICD-10-CM

## 2023-02-15 PROCEDURE — 86850 RBC ANTIBODY SCREEN: CPT | Performed by: INTERNAL MEDICINE

## 2023-02-15 PROCEDURE — 86900 BLOOD TYPING SEROLOGIC ABO: CPT | Performed by: INTERNAL MEDICINE

## 2023-02-15 PROCEDURE — 25010000002 HEPARIN LOCK FLUSH PER 10 UNITS: Performed by: INTERNAL MEDICINE

## 2023-02-15 PROCEDURE — P9100 PATHOGEN TEST FOR PLATELETS: HCPCS

## 2023-02-15 PROCEDURE — 36430 TRANSFUSION BLD/BLD COMPNT: CPT

## 2023-02-15 PROCEDURE — 36591 DRAW BLOOD OFF VENOUS DEVICE: CPT

## 2023-02-15 PROCEDURE — 86901 BLOOD TYPING SEROLOGIC RH(D): CPT | Performed by: INTERNAL MEDICINE

## 2023-02-15 PROCEDURE — P9037 PLATE PHERES LEUKOREDU IRRAD: HCPCS

## 2023-02-15 PROCEDURE — G0463 HOSPITAL OUTPT CLINIC VISIT: HCPCS

## 2023-02-15 RX ORDER — HEPARIN SODIUM (PORCINE) LOCK FLUSH IV SOLN 100 UNIT/ML 100 UNIT/ML
500 SOLUTION INTRAVENOUS AS NEEDED
Status: DISCONTINUED | OUTPATIENT
Start: 2023-02-15 | End: 2023-02-17 | Stop reason: HOSPADM

## 2023-02-15 RX ORDER — HEPARIN SODIUM (PORCINE) LOCK FLUSH IV SOLN 100 UNIT/ML 100 UNIT/ML
500 SOLUTION INTRAVENOUS AS NEEDED
OUTPATIENT
Start: 2023-02-15

## 2023-02-15 RX ADMIN — Medication 500 UNITS: at 11:44

## 2023-03-06 ENCOUNTER — APPOINTMENT (OUTPATIENT)
Dept: CT IMAGING | Facility: HOSPITAL | Age: 43
End: 2023-03-06
Payer: MEDICARE

## 2023-03-06 ENCOUNTER — HOSPITAL ENCOUNTER (EMERGENCY)
Facility: HOSPITAL | Age: 43
Discharge: HOME | End: 2023-03-06
Attending: EMERGENCY MEDICINE | Admitting: EMERGENCY MEDICINE
Payer: MEDICARE

## 2023-03-06 ENCOUNTER — APPOINTMENT (OUTPATIENT)
Dept: GENERAL RADIOLOGY | Facility: HOSPITAL | Age: 43
End: 2023-03-06
Payer: MEDICARE

## 2023-03-06 VITALS
DIASTOLIC BLOOD PRESSURE: 84 MMHG | HEART RATE: 65 BPM | SYSTOLIC BLOOD PRESSURE: 122 MMHG | HEIGHT: 65 IN | WEIGHT: 196.43 LBS | RESPIRATION RATE: 18 BRPM | OXYGEN SATURATION: 92 % | TEMPERATURE: 98.1 F | BODY MASS INDEX: 32.73 KG/M2

## 2023-03-06 DIAGNOSIS — M54.2 NECK PAIN: Primary | ICD-10-CM

## 2023-03-06 LAB
ALBUMIN SERPL-MCNC: 4.3 G/DL (ref 3.5–5.2)
ALBUMIN/GLOB SERPL: 1.3 G/DL
ALP SERPL-CCNC: 78 U/L (ref 39–117)
ALT SERPL W P-5'-P-CCNC: 11 U/L (ref 1–33)
ANION GAP SERPL CALCULATED.3IONS-SCNC: 10.2 MMOL/L (ref 5–15)
AST SERPL-CCNC: 17 U/L (ref 1–32)
BASOPHILS # BLD AUTO: 0.03 10*3/MM3 (ref 0–0.2)
BASOPHILS NFR BLD AUTO: 0.4 % (ref 0–1.5)
BILIRUB SERPL-MCNC: 0.2 MG/DL (ref 0–1.2)
BUN SERPL-MCNC: 11 MG/DL (ref 6–20)
BUN/CREAT SERPL: 20.4 (ref 7–25)
CALCIUM SPEC-SCNC: 9.5 MG/DL (ref 8.6–10.5)
CHLORIDE SERPL-SCNC: 101 MMOL/L (ref 98–107)
CO2 SERPL-SCNC: 26.8 MMOL/L (ref 22–29)
CREAT SERPL-MCNC: 0.54 MG/DL (ref 0.57–1)
DEPRECATED RDW RBC AUTO: 41.8 FL (ref 37–54)
EGFRCR SERPLBLD CKD-EPI 2021: 118.8 ML/MIN/1.73
EOSINOPHIL # BLD AUTO: 0.1 10*3/MM3 (ref 0–0.4)
EOSINOPHIL NFR BLD AUTO: 1.4 % (ref 0.3–6.2)
ERYTHROCYTE [DISTWIDTH] IN BLOOD BY AUTOMATED COUNT: 12.8 % (ref 12.3–15.4)
GLOBULIN UR ELPH-MCNC: 3.3 GM/DL
GLUCOSE SERPL-MCNC: 82 MG/DL (ref 65–99)
HCT VFR BLD AUTO: 43.9 % (ref 34–46.6)
HGB BLD-MCNC: 14.9 G/DL (ref 12–15.9)
HOLD SPECIMEN: NORMAL
HOLD SPECIMEN: NORMAL
IMM GRANULOCYTES # BLD AUTO: 0.01 10*3/MM3 (ref 0–0.05)
IMM GRANULOCYTES NFR BLD AUTO: 0.1 % (ref 0–0.5)
LYMPHOCYTES # BLD AUTO: 2.47 10*3/MM3 (ref 0.7–3.1)
LYMPHOCYTES NFR BLD AUTO: 33.8 % (ref 19.6–45.3)
MCH RBC QN AUTO: 30.3 PG (ref 26.6–33)
MCHC RBC AUTO-ENTMCNC: 33.9 G/DL (ref 31.5–35.7)
MCV RBC AUTO: 89.4 FL (ref 79–97)
MONOCYTES # BLD AUTO: 0.35 10*3/MM3 (ref 0.1–0.9)
MONOCYTES NFR BLD AUTO: 4.8 % (ref 5–12)
NEUTROPHILS NFR BLD AUTO: 4.35 10*3/MM3 (ref 1.7–7)
NEUTROPHILS NFR BLD AUTO: 59.5 % (ref 42.7–76)
NRBC BLD AUTO-RTO: 0 /100 WBC (ref 0–0.2)
PLATELET # BLD AUTO: 252 10*3/MM3 (ref 140–450)
PMV BLD AUTO: 9.1 FL (ref 6–12)
POTASSIUM SERPL-SCNC: 3.8 MMOL/L (ref 3.5–5.2)
PROT SERPL-MCNC: 7.6 G/DL (ref 6–8.5)
RBC # BLD AUTO: 4.91 10*6/MM3 (ref 3.77–5.28)
SODIUM SERPL-SCNC: 138 MMOL/L (ref 136–145)
WBC NRBC COR # BLD: 7.31 10*3/MM3 (ref 3.4–10.8)
WHOLE BLOOD HOLD COAG: NORMAL
WHOLE BLOOD HOLD SPECIMEN: NORMAL

## 2023-03-06 PROCEDURE — 80053 COMPREHEN METABOLIC PANEL: CPT | Performed by: NURSE PRACTITIONER

## 2023-03-06 PROCEDURE — 99283 EMERGENCY DEPT VISIT LOW MDM: CPT

## 2023-03-06 PROCEDURE — 0 IOHEXOL 300 MG/ML SOLUTION: Performed by: EMERGENCY MEDICINE

## 2023-03-06 PROCEDURE — 85025 COMPLETE CBC W/AUTO DIFF WBC: CPT | Performed by: NURSE PRACTITIONER

## 2023-03-06 PROCEDURE — 70491 CT SOFT TISSUE NECK W/DYE: CPT

## 2023-03-06 PROCEDURE — 71045 X-RAY EXAM CHEST 1 VIEW: CPT

## 2023-03-06 RX ORDER — SODIUM CHLORIDE 0.9 % (FLUSH) 0.9 %
10 SYRINGE (ML) INJECTION AS NEEDED
Status: DISCONTINUED | OUTPATIENT
Start: 2023-03-06 | End: 2023-03-06 | Stop reason: HOSPADM

## 2023-03-06 RX ADMIN — IOHEXOL 100 ML: 300 INJECTION, SOLUTION INTRAVENOUS at 14:02

## 2023-03-06 NOTE — ED PROVIDER NOTES
Time: 12:05 PM EST  Date of encounter:  3/6/2023  Independent Historian/Clinical History and Information was obtained by:   Patient  Chief Complaint   Patient presents with   • Sore Throat   • Vascular Access Problem     possible       History is limited by: N/A    History of Present Illness:  Patient is a 42 y.o. year old female who presents to the emergency department for evaluation of right-sided neck pain for the last 3 or 4 days.  She said it is worse when she takes a deep breath.  She tried to get an appointment with her provider at [facility] however she was unable to get one today.  When asked what her complaint was they advised her to come to the ER.  Patient does have a port in her right chest due to a bleeding disorder requiring platelets every 4 to 6 weeks.  She has had this Port-A-Cath since 2005.  She has had 2 previous infections in the port that were caught early but she says that both of those occasions she had pain at the actual port site.  Patient states that she does not have sore throat but has pain with swallowing. Patient points to the right side of neck as location of pain. Patient states that she had her port accessed 2 weeks ago and no problems were noted. Patient states that neck pain hurts with deep inspiration. Patient states that at rest her pain is 7/10 and the pain is intermittent.       Neck Pain  Pain location:  R side  Quality: sharp and uncomfortable.  Pain severity:  Moderate  Pain is:  Worse during the night  Duration:  4 days  Timing:  Intermittent  Worsened by:  Position and swallowing (deep inspiration )  Associated symptoms: no chest pain, no fever and no headaches        Patient Care Team  Primary Care Provider: Victoria Russell APRN    Past Medical History:     Allergies   Allergen Reactions   • Adhesive Tape Rash   • Morphine Other (See Comments)     CAUSES CHEST PAIN     Past Medical History:   Diagnosis Date   • Anxiety    • Bleeding disorder (HCC)    • Blood  disease    • Depression    • Disease of thyroid gland    • Migraine    • Mood disorder (HCC)    • Ovarian cyst    • Urinary tract infection      Past Surgical History:   Procedure Laterality Date   • COLONOSCOPY     • GALLBLADDER SURGERY     • HYSTERECTOMY     • PORTACATH PLACEMENT     • WISDOM TOOTH EXTRACTION       Family History   Problem Relation Age of Onset   • Stroke Father    • Heart disease Father    • Cancer Father    • Diabetes Mother    • Other Mother         blood diseases   • Diabetes Sister    • Kidney cancer Maternal Grandmother    • Breast cancer Neg Hx    • Colon cancer Neg Hx    • Ovarian cancer Neg Hx    • Uterine cancer Neg Hx    • Prostate cancer Neg Hx        Home Medications:  Prior to Admission medications    Medication Sig Start Date End Date Taking? Authorizing Provider   ARIPiprazole (ABILIFY) 5 MG tablet Abilify 5 mg oral tablet take 1 tablet (5 mg) by oral route once daily   Active    ProviderDebbie MD   busPIRone (BUSPAR) 7.5 MG tablet  5/14/21   Debbie Linton MD   butalbital-acetaminophen-caffeine (FIORICET, ESGIC) -40 MG per tablet Take 1 tablet by mouth 4 (Four) Times a Day As Needed. 4/19/21   Debbie Linton MD   diclofenac (VOLTAREN) 75 MG EC tablet Take 75 mg by mouth 2 (Two) Times a Day As Needed. 4/27/21   Debbie Linton MD   estradiol (ESTRACE) 1 MG tablet Take 1 tablet by mouth Daily for 90 days. 3/28/22 6/26/22  Leatha Vilchis APRN   Estring 2 MG vaginal ring insert vaginally EVERY 90 DAYS 4/7/21   Debbie Linton MD   ibuprofen (ADVIL,MOTRIN) 800 MG tablet Take 800 mg by mouth 4 (Four) Times a Day As Needed. 4/19/21   Debbie Linton MD   levothyroxine (SYNTHROID, LEVOTHROID) 25 MCG tablet Take 25 mcg by mouth Daily. 10/17/22   Debbie Linton MD   phentermine (ADIPEX-P) 37.5 MG tablet Take 37.5 mg by mouth Daily. 10/17/22   Debbie Linton MD   venlafaxine XR (EFFEXOR-XR) 75 MG 24 hr capsule Effexor XR 75 mg  "oral capsule,extended release 24hr take 1 capsule (75 mg) by oral route once daily   Active    Provider, Debbie, MD        Social History:   Social History     Tobacco Use   • Smoking status: Never   • Smokeless tobacco: Never   • Tobacco comments:     01/01/1990   Vaping Use   • Vaping Use: Never used   Substance Use Topics   • Alcohol use: Not Currently   • Drug use: Yes     Types: Marijuana         Review of Systems:  Review of Systems   Constitutional: Negative for chills and fever.   HENT: Negative for congestion, rhinorrhea and sore throat.    Eyes: Negative for pain and visual disturbance.   Respiratory: Negative for apnea, cough, chest tightness and shortness of breath.    Cardiovascular: Negative for chest pain and palpitations.   Gastrointestinal: Negative for abdominal pain, diarrhea, nausea and vomiting.   Genitourinary: Negative for difficulty urinating and dysuria.   Musculoskeletal: Positive for neck pain. Negative for joint swelling and myalgias.   Skin: Negative for color change.   Neurological: Negative for seizures and headaches.   Psychiatric/Behavioral: Negative.    All other systems reviewed and are negative.       Physical Exam:  /84   Pulse 65   Temp 98.1 °F (36.7 °C) (Oral)   Resp 18   Ht 165.1 cm (65\")   Wt 89.1 kg (196 lb 6.9 oz)   SpO2 92%   BMI 32.69 kg/m²     Physical Exam  Vitals and nursing note reviewed.   Constitutional:       General: She is not in acute distress.     Appearance: Normal appearance. She is not toxic-appearing.   HENT:      Head: Normocephalic and atraumatic.      Jaw: There is normal jaw occlusion.   Eyes:      General: Lids are normal.      Extraocular Movements: Extraocular movements intact.      Conjunctiva/sclera: Conjunctivae normal.      Pupils: Pupils are equal, round, and reactive to light.   Neck:      Comments: No swelling   Cardiovascular:      Rate and Rhythm: Normal rate and regular rhythm.      Pulses: Normal pulses.      Heart sounds: " Normal heart sounds.   Pulmonary:      Effort: Pulmonary effort is normal. No respiratory distress.      Breath sounds: Normal breath sounds. No wheezing or rhonchi.   Abdominal:      General: Abdomen is flat.      Palpations: Abdomen is soft.      Tenderness: There is no abdominal tenderness. There is no guarding or rebound.   Musculoskeletal:         General: Normal range of motion.      Cervical back: Normal range of motion and neck supple. No erythema. No spinous process tenderness or muscular tenderness.      Right lower leg: No edema.      Left lower leg: No edema.   Lymphadenopathy:      Cervical: No cervical adenopathy.   Skin:     General: Skin is warm and dry.   Neurological:      Mental Status: She is alert and oriented to person, place, and time. Mental status is at baseline.   Psychiatric:         Mood and Affect: Mood normal.                  Procedures:  Procedures      Medical Decision Making:      Comorbidities that affect care:    Thyroid Disease, Bleeding disorder, Depression, Anxiety, Migraine, Blood disease     External Notes reviewed:    Previous Clinic Note: Patient was recently seen at Dr. Espinal's office for a bump found on her vagina.      The following orders were placed and all results were independently analyzed by me:  Orders Placed This Encounter   Procedures   • CT Soft Tissue Neck With Contrast   • XR Chest 1 View   • Comprehensive Metabolic Panel   • Humphrey Draw   • CBC Auto Differential   • Insert Peripheral IV   • CBC & Differential   • Green Top (Gel)   • Lavender Top   • Gold Top - SST   • Light Blue Top       Medications Given in the Emergency Department:  Medications   sodium chloride 0.9 % flush 10 mL (has no administration in time range)   iohexol (OMNIPAQUE) 300 MG/ML injection 100 mL (100 mL Intravenous Given 3/6/23 1402)        ED Course:    The patient was initially evaluated in the triage area where orders were placed. The patient was later dispositioned by Melissa  MD Lizzy.      The patient was advised to stay for completion of workup which includes but is not limited to communication of labs and radiological results, reassessment and plan. The patient was advised that leaving prior to disposition by a provider could result in critical findings that are not communicated to the patient.          Labs:    Lab Results (last 24 hours)     Procedure Component Value Units Date/Time    CBC & Differential [[id number]]  (Abnormal) Collected: 03/06/23 1224    Specimen: Blood Updated: 03/06/23 1237    Narrative:      The following orders were created for panel order CBC & Differential.  Procedure                               Abnormality         Status                     ---------                               -----------         ------                     CBC Auto Differential[[id number]]        Abnormal            Final result                 Please view results for these tests on the individual orders.    Comprehensive Metabolic Panel [[id number]]  (Abnormal) Collected: 03/06/23 1224    Specimen: Blood Updated: 03/06/23 1312     Glucose 82 mg/dL      BUN 11 mg/dL      Creatinine 0.54 mg/dL      Sodium 138 mmol/L      Potassium 3.8 mmol/L      Comment: Slight hemolysis detected by analyzer. Results may be affected.        Chloride 101 mmol/L      CO2 26.8 mmol/L      Calcium 9.5 mg/dL      Total Protein 7.6 g/dL      Albumin 4.3 g/dL      ALT (SGPT) 11 U/L      AST (SGOT) 17 U/L      Alkaline Phosphatase 78 U/L      Total Bilirubin 0.2 mg/dL      Globulin 3.3 gm/dL      A/G Ratio 1.3 g/dL      BUN/Creatinine Ratio 20.4     Anion Gap 10.2 mmol/L      eGFR 118.8 mL/min/1.73     Narrative:      GFR Normal >60  Chronic Kidney Disease <60  Kidney Failure <15      CBC Auto Differential [[id number]]  (Abnormal) Collected: 03/06/23 1224    Specimen: Blood Updated: 03/06/23 1237     WBC 7.31 10*3/mm3      RBC 4.91 10*6/mm3      Hemoglobin 14.9 g/dL      Hematocrit 43.9 %      MCV 89.4 fL       MCH 30.3 pg      MCHC 33.9 g/dL      RDW 12.8 %      RDW-SD 41.8 fl      MPV 9.1 fL      Platelets 252 10*3/mm3      Neutrophil % 59.5 %      Lymphocyte % 33.8 %      Monocyte % 4.8 %      Eosinophil % 1.4 %      Basophil % 0.4 %      Immature Grans % 0.1 %      Neutrophils, Absolute 4.35 10*3/mm3      Lymphocytes, Absolute 2.47 10*3/mm3      Monocytes, Absolute 0.35 10*3/mm3      Eosinophils, Absolute 0.10 10*3/mm3      Basophils, Absolute 0.03 10*3/mm3      Immature Grans, Absolute 0.01 10*3/mm3      nRBC 0.0 /100 WBC            Imaging:    CT Soft Tissue Neck With Contrast    Result Date: 3/6/2023  PROCEDURE: CT SOFT TISSUE NECK W CONTRAST  COMPARISON: [facility], CT, HEAD W/O CSPINE W/O CONTRAST, 11/03/2020, 18:54.  [facility], CT, CT CHEST ABD PEL W CONTRAST, 11/03/2020, 18:58.  [facility], CR, XR CHEST 1 VW, 3/06/2023, 12:46.  INDICATIONS: neck pain  PROTOCOL:   Standard imaging protocol performed    RADIATION:   DLP: 327.9mGy*cm   Automated exposure control was utilized to minimize radiation dose. CONTRAST: 100cc Omnipaque 300 I.V.  TECHNIQUE: CT images were obtained with non-ionic intravenous contrast material.   FINDINGS:  No intracranial abnormality is evident.  The orbits have a normal appearance.  The paranasal sinuses, middle ears, and mastoid air cells are well aerated.  The salivary glands have a normal appearance.  The thyroid gland has a normal appearance.  Vascular structures enhance in a normal fashion.  No soft tissue mass or adenopathy is seen.  No foreign body is evident.  Mild degenerative spurring is seen in the cervical spine.  5 mm pleural-based nodule in the posterior right upper lobe is probably postinflammatory, and is unchanged in comparison to 11/3/2020, consistent with a benign etiology.        CT scan of the soft tissues of the neck demonstrating no mass or adenopathy.     DWAYNE ZURITA MD       Electronically Signed and Approved By: DWAYNE  MD YUDITH on 3/06/2023 at 14:39             XR Chest 1 View    Result Date: 3/6/2023  PROCEDURE: XR CHEST 1 VW  COMPARISON: [facility], , CHEST AP/PA 1 VIEW, 10/12/2005, 9:44.  INDICATIONS: Sore Throat; Vascular Access Problem  FINDINGS:  The heart is normal in size.  The lungs are well-expanded and free of infiltrates.  Right subclavian Port-A-Cath tip is in the inferior SVC.  Bony structures appear intact.        No active disease is seen.  Right subclavian Port-A-Cath tip is in the inferior SVC.       DWAYNE ZURITA MD       Electronically Signed and Approved By: DWAYNE ZURITA MD on 3/06/2023 at 12:51                 Differential Diagnosis and Discussion:      Neck Pain: The patient presents with neck pain. My differential diagnosis includes but is not limited to acute spinal epidural abscess, acute spinal epidural bleed, meningitis, musculoskeletal neck pain, spinal fracture, and osteoarthritis.     All labs were reviewed and interpreted by me.  All X-rays were independently reviewed by me.  CT scan radiology interpretation was reviewed by me.    MDM  Number of Diagnoses or Management Options  Neck pain  Diagnosis management comments: The patient´s CBC that was reviewed and interpreted by me shows no abnormalities of critical concern. Of note, there is no anemia requiring a blood transfusion and the platelet count is acceptable.  The patient´s CMP that was reviewed and interpretted by me shows no abnormalities of critical concern. Of note, the patient´s sodium and potassium are acceptable. The patient´s liver enzymes are unremarkable. The patient´s renal function (creatinine) is preserved. The patient has a normal anion gap.  Soft tissue neck does show a posterior lung nodule.  Chest x-ray is negative for infiltrate.       Amount and/or Complexity of Data Reviewed  Clinical lab tests: reviewed  Tests in the radiology section of CPT®: reviewed  Independent visualization of images, tracings, or  specimens: yes    Risk of Complications, Morbidity, and/or Mortality  Presenting problems: moderate  Management options: moderate    Patient Progress  Patient progress: stable (14:56 EST Updated patient on results and plan. Patient expressed understanding and agreement. All questions answered at this time.   14:56 EST Updated patient on results and plan for discharge. Patient expressed understanding and agreement. All questions answered at this time.  )           Patient Care Considerations:    CT CHEST: I considered ordering a CT scan of the chest, however The lung nodule was seen on soft tissue neck CT, and as the patient has a bleeding disorder PE is an unlikely differential.      Consultants/Shared Management Plan:    None    Social Determinants of Health:    Patient is independent, reliable, and has access to care.       Disposition and Care Coordination:    Discharged: The patient is suitable and stable for discharge with no need for consideration of observation or admission.    I have explained the patient´s condition, diagnoses and treatment plan based on the information available to me at this time. I have answered questions and addressed any concerns. The patient has a good  understanding of the patient´s diagnosis, condition, and treatment plan as can be expected at this point. The vital signs have been stable. The patient´s condition is stable and appropriate for discharge from the emergency department.      The patient will pursue further outpatient evaluation with the primary care physician or other designated or consulting physician as outlined in the discharge instructions. They are agreeable to this plan of care and follow-up instructions have been explained in detail. The patient has received these instructions in written format and have expressed an understanding of the discharge instructions. The patient is aware that any significant change in condition or worsening of symptoms should prompt an  immediate return to this or the closest emergency department or call to 911.  I have explained discharge medications and the need for follow up with the patient/caretakers. This was also printed in the discharge instructions. Patient was discharged with the following medications and follow up:      Medication List      No changes were made to your prescriptions during this visit.      Elly Russellanda, APRN  [address] [id number]  [phone]             Final diagnoses:   Neck pain        ED Disposition     ED Disposition   Discharge    Condition   Stable    Comment   --             This medical record created using voice recognition software.  Documentation assistance provided by Regi Santillan acting as scribe for Melissa Ball MD. Information recorded by the scribe was done at my direction and has been verified and validated by me.              Regi Santillan  03/06/23 1413       Regi Santillan  03/06/23 1485       Melissa Ball MD  03/06/23 1580

## 2023-03-06 NOTE — ED NOTES
Pt has a port in right chest. Pt is having pain in right side of neck and throat, has not had a temp, this began 4 days ago. Attempted to see PCP but was unable to get an appointment and was advised to come to ED.

## 2023-03-31 ENCOUNTER — HOSPITAL ENCOUNTER (EMERGENCY)
Facility: HOSPITAL | Age: 43
Discharge: HOME | End: 2023-03-31
Attending: EMERGENCY MEDICINE | Admitting: EMERGENCY MEDICINE
Payer: MEDICARE

## 2023-03-31 ENCOUNTER — APPOINTMENT (OUTPATIENT)
Dept: CT IMAGING | Facility: HOSPITAL | Age: 43
End: 2023-03-31
Payer: MEDICARE

## 2023-03-31 VITALS
TEMPERATURE: 98.5 F | OXYGEN SATURATION: 96 % | SYSTOLIC BLOOD PRESSURE: 79 MMHG | RESPIRATION RATE: 16 BRPM | HEART RATE: 80 BPM | BODY MASS INDEX: 31.33 KG/M2 | HEIGHT: 65 IN | DIASTOLIC BLOOD PRESSURE: 52 MMHG | WEIGHT: 188.05 LBS

## 2023-03-31 DIAGNOSIS — R59.9 ADENOPATHY: Primary | ICD-10-CM

## 2023-03-31 DIAGNOSIS — G43.809 OTHER MIGRAINE WITHOUT STATUS MIGRAINOSUS, NOT INTRACTABLE: ICD-10-CM

## 2023-03-31 PROCEDURE — 25010000002 METOCLOPRAMIDE PER 10 MG: Performed by: EMERGENCY MEDICINE

## 2023-03-31 PROCEDURE — 99283 EMERGENCY DEPT VISIT LOW MDM: CPT

## 2023-03-31 PROCEDURE — 96374 THER/PROPH/DIAG INJ IV PUSH: CPT

## 2023-03-31 PROCEDURE — 70450 CT HEAD/BRAIN W/O DYE: CPT

## 2023-03-31 PROCEDURE — 25010000002 DIPHENHYDRAMINE PER 50 MG: Performed by: EMERGENCY MEDICINE

## 2023-03-31 PROCEDURE — 96375 TX/PRO/DX INJ NEW DRUG ADDON: CPT

## 2023-03-31 PROCEDURE — 25010000002 KETOROLAC TROMETHAMINE PER 15 MG: Performed by: EMERGENCY MEDICINE

## 2023-03-31 RX ORDER — METOCLOPRAMIDE HYDROCHLORIDE 5 MG/ML
10 INJECTION INTRAMUSCULAR; INTRAVENOUS ONCE
Status: COMPLETED | OUTPATIENT
Start: 2023-03-31 | End: 2023-03-31

## 2023-03-31 RX ORDER — KETOROLAC TROMETHAMINE 30 MG/ML
30 INJECTION, SOLUTION INTRAMUSCULAR; INTRAVENOUS ONCE
Status: COMPLETED | OUTPATIENT
Start: 2023-03-31 | End: 2023-03-31

## 2023-03-31 RX ORDER — METOCLOPRAMIDE 10 MG/1
TABLET ORAL
Qty: 15 TABLET | Refills: 0 | Status: SHIPPED | OUTPATIENT
Start: 2023-03-31

## 2023-03-31 RX ORDER — SODIUM CHLORIDE 0.9 % (FLUSH) 0.9 %
10 SYRINGE (ML) INJECTION AS NEEDED
Status: DISCONTINUED | OUTPATIENT
Start: 2023-03-31 | End: 2023-03-31 | Stop reason: HOSPADM

## 2023-03-31 RX ORDER — DIPHENHYDRAMINE HYDROCHLORIDE 50 MG/ML
25 INJECTION INTRAMUSCULAR; INTRAVENOUS ONCE
Status: COMPLETED | OUTPATIENT
Start: 2023-03-31 | End: 2023-03-31

## 2023-03-31 RX ADMIN — SODIUM CHLORIDE 500 ML: 9 INJECTION, SOLUTION INTRAVENOUS at 09:39

## 2023-03-31 RX ADMIN — KETOROLAC TROMETHAMINE 30 MG: 30 INJECTION, SOLUTION INTRAMUSCULAR; INTRAVENOUS at 09:44

## 2023-03-31 RX ADMIN — DIPHENHYDRAMINE HYDROCHLORIDE 25 MG: 50 INJECTION, SOLUTION INTRAMUSCULAR; INTRAVENOUS at 09:40

## 2023-03-31 RX ADMIN — METOCLOPRAMIDE HYDROCHLORIDE 10 MG: 5 INJECTION INTRAMUSCULAR; INTRAVENOUS at 09:49

## 2023-03-31 NOTE — ED PROVIDER NOTES
Time: 8:59 AM EDT  Date of encounter:  3/31/2023  Independent Historian/Clinical History and Information was obtained by:   Patient  Chief Complaint: headache    History is limited by: N/A    History of Present Illness:  Patient is a 43 y.o. year old female who presents to the emergency department for evaluation of headache.  The patient states that yesterday evening she started developing well localized left posterior auricular pain in the region of the left mastoid.  The patient states that the pain is both sharp and dull and it radiates slightly inferiorly to the nuchal area.  Patient has no diffuse neck pain she has had no fever chills and she does have mild runny nose and congestion which is left over from a pressure infection she had last week.  The patient denies any earache however she does wear hearing aids bilaterally.  Patient is had no nausea vomiting or diarrhea she denies no rash or signs of zoster.  She has had no new numbness, weakness, visual disturbance, other neurologic symptoms.  She states that this pain which is well localized has not triggered one of her migraine headaches      HPI    Patient Care Team  Primary Care Provider: Victoria Russell APRN    Past Medical History:     Allergies   Allergen Reactions   • Adhesive Tape Rash   • Morphine Other (See Comments)     CAUSES CHEST PAIN     Past Medical History:   Diagnosis Date   • Anxiety    • Bleeding disorder (HCC)    • Blood disease    • Depression    • Disease of thyroid gland    • Migraine    • Mood disorder (HCC)    • Ovarian cyst    • Urinary tract infection      Past Surgical History:   Procedure Laterality Date   • COLONOSCOPY     • GALLBLADDER SURGERY     • HYSTERECTOMY     • PORTACATH PLACEMENT     • WISDOM TOOTH EXTRACTION       Family History   Problem Relation Age of Onset   • Stroke Father    • Heart disease Father    • Cancer Father    • Diabetes Mother    • Other Mother         blood diseases   • Diabetes Sister    • Kidney  cancer Maternal Grandmother    • Breast cancer Neg Hx    • Colon cancer Neg Hx    • Ovarian cancer Neg Hx    • Uterine cancer Neg Hx    • Prostate cancer Neg Hx        Home Medications:  Prior to Admission medications    Medication Sig Start Date End Date Taking? Authorizing Provider   ARIPiprazole (ABILIFY) 5 MG tablet Abilify 5 mg oral tablet take 1 tablet (5 mg) by oral route once daily   Active    Debbie Linton MD   busPIRone (BUSPAR) 7.5 MG tablet  5/14/21   Debbie Linton MD   butalbital-acetaminophen-caffeine (FIORICET, ESGIC) -40 MG per tablet Take 1 tablet by mouth 4 (Four) Times a Day As Needed. 4/19/21   Debbie Linton MD   diclofenac (VOLTAREN) 75 MG EC tablet Take 75 mg by mouth 2 (Two) Times a Day As Needed. 4/27/21   Debbie Linton MD   estradiol (ESTRACE) 1 MG tablet Take 1 tablet by mouth Daily for 90 days. 3/28/22 6/26/22  Leatha Vilchis APRN   Estring 2 MG vaginal ring insert vaginally EVERY 90 DAYS 4/7/21   Debbie Linton MD   ibuprofen (ADVIL,MOTRIN) 800 MG tablet Take 800 mg by mouth 4 (Four) Times a Day As Needed. 4/19/21   Debbie Linton MD   levothyroxine (SYNTHROID, LEVOTHROID) 25 MCG tablet Take 25 mcg by mouth Daily. 10/17/22   Debbie Linton MD   phentermine (ADIPEX-P) 37.5 MG tablet Take 37.5 mg by mouth Daily. 10/17/22   Debbie Linton MD   venlafaxine XR (EFFEXOR-XR) 75 MG 24 hr capsule Effexor XR 75 mg oral capsule,extended release 24hr take 1 capsule (75 mg) by oral route once daily   Active    Debbie Linton MD        Social History:   Social History     Tobacco Use   • Smoking status: Never   • Smokeless tobacco: Never   • Tobacco comments:     01/01/1990   Vaping Use   • Vaping Use: Never used   Substance Use Topics   • Alcohol use: Not Currently   • Drug use: Yes     Types: Marijuana         Review of Systems:  Review of Systems   Constitutional: Negative for chills and fever.   HENT: Negative for  "congestion, ear pain and sore throat.    Eyes: Negative for pain.   Respiratory: Negative for cough, chest tightness and shortness of breath.    Cardiovascular: Negative for chest pain.   Gastrointestinal: Negative for abdominal pain, diarrhea, nausea and vomiting.   Genitourinary: Negative for flank pain and hematuria.   Musculoskeletal: Negative for joint swelling.   Skin: Negative for pallor.   Neurological: Positive for headaches. Negative for dizziness, seizures, syncope, facial asymmetry, weakness and numbness.   All other systems reviewed and are negative.       Physical Exam:  BP (!) 79/52   Pulse 80   Temp 98.5 °F (36.9 °C) (Oral)   Resp 16   Ht 165.1 cm (65\")   Wt 85.3 kg (188 lb 0.8 oz)   SpO2 96%   BMI 31.29 kg/m²     Physical Exam  Vitals and nursing note reviewed.   Constitutional:       General: She is not in acute distress.     Appearance: Normal appearance. She is not toxic-appearing.   HENT:      Head: Normocephalic and atraumatic.      Comments: Is mild tenderness and adenopathy over the left mastoid region however there is no erythema or signs of acute mastoiditis     Right Ear: Tympanic membrane and external ear normal.      Left Ear: Tympanic membrane and external ear normal.      Ears:      Comments: Patient is well localized tenderness without erythema over the left mastoid.     Nose: Nose normal.      Mouth/Throat:      Mouth: Mucous membranes are moist.   Eyes:      General: No scleral icterus.     Extraocular Movements: Extraocular movements intact.      Pupils: Pupils are equal, round, and reactive to light.   Neck:      Comments: No sign of meningeal irritation.  Cardiovascular:      Rate and Rhythm: Normal rate and regular rhythm.      Pulses: Normal pulses.      Heart sounds: Normal heart sounds.   Pulmonary:      Effort: Pulmonary effort is normal. No respiratory distress.      Breath sounds: Normal breath sounds.   Abdominal:      General: Abdomen is flat.      Palpations: " Abdomen is soft.      Tenderness: There is no abdominal tenderness.   Musculoskeletal:         General: Normal range of motion.      Cervical back: Normal range of motion and neck supple. No rigidity or tenderness.   Skin:     General: Skin is warm and dry.      Capillary Refill: Capillary refill takes less than 2 seconds.      Findings: No rash.      Comments: There is no facial or scalp rash and there is no sign of zoster noted.   Neurological:      Mental Status: She is alert and oriented to person, place, and time. Mental status is at baseline.      Cranial Nerves: No cranial nerve deficit.      Sensory: No sensory deficit.      Motor: No weakness.      Coordination: Coordination normal.   Psychiatric:         Mood and Affect: Mood normal.         Behavior: Behavior normal.         Thought Content: Thought content normal.         Judgment: Judgment normal.                  Procedures:  Procedures      Medical Decision Making:      Comorbidities that affect care:    Migraine headache    External Notes reviewed:    Previous Clinic Note: With Dr. Garcia      The following orders were placed and all results were independently analyzed by me:  Orders Placed This Encounter   Procedures   • CT Head Without Contrast   • Insert peripheral IV       Medications Given in the Emergency Department:  Medications   sodium chloride 0.9 % flush 10 mL (has no administration in time range)   sodium chloride 0.9 % bolus 500 mL (0 mL Intravenous Stopped 3/31/23 1107)   ketorolac (TORADOL) injection 30 mg (30 mg Intravenous Given 3/31/23 0944)   metoclopramide (REGLAN) injection 10 mg (10 mg Intravenous Given 3/31/23 0949)   diphenhydrAMINE (BENADRYL) injection 25 mg (25 mg Intravenous Given 3/31/23 0940)        ED Course:         Labs:    Lab Results (last 24 hours)     ** No results found for the last 24 hours. **           Imaging:    CT Head Without Contrast    Result Date: 3/31/2023  PROCEDURE: CT HEAD WO CONTRAST  COMPARISON:   [facility], CT, HEAD W/O CSPINE W/O CONTRAST, 11/03/2020, 18:54. INDICATIONS: Headache with specific pain in the left mastoid region.  PROTOCOL:   Standard imaging protocol performed    RADIATION:   DLP: 954.5 mGy*cm   MA and/or KV was adjusted to minimize radiation dose.     TECHNIQUE: After obtaining the patient's consent, CT images were obtained without non-ionic intravenous contrast material.  FINDINGS:  CEREBRUM: No edema, hemorrhage, mass, acute infarction, or inappropriate atrophy.  CEREBELLUM: No edema, hemorrhage, mass, acute infarction, or inappropriate atrophy.  BRAINSTEM: No edema, hemorrhage, mass, acute infarction, or inappropriate atrophy.  CSF SPACES: Ventricles, cisterns, and sulci are appropriate for age.  No hydrocephalus, subarachnoid hemorrhage, or mass.  SKULL: No mass or other significant visible lesion.  SINUSES: Limited views demonstrate no significant mucosal thickening or fluid.  ORBITS: Limited views are unremarkable.  OTHER: Negative.        No acute disease.      SONG COLEMAN MD       Electronically Signed and Approved By: SONG COLEMAN MD on 3/31/2023 at 10:28                 Differential Diagnosis and Discussion:    Headache: Differential diagnosis includes but is not limited to migraine, cluster headache, hypertension, tumor, subarachnoid bleeding, pseudotumor cerebri, temporal arteritis, infections, tension headache, and TMJ syndrome.        MDM  Number of Diagnoses or Management Options  Adenopathy  Other migraine without status migrainosus, not intractable  Diagnosis management comments: Patient is improved after treatment in the emergency department her CT is unremarkable.  Patient is asymptomatic at this point       Amount and/or Complexity of Data Reviewed  Tests in the radiology section of CPT®: reviewed  Decide to obtain previous medical records or to obtain history from someone other than the patient: yes  Obtain history from someone other than the patient:  yes  Review and summarize past medical records: yes  Independent visualization of images, tracings, or specimens: yes             Patient Care Considerations:    MRI: I considered ordering an MRI however CT of the head will adequately evaluate the mastoid region.      Consultants/Shared Management Plan:    None    Social Determinants of Health:    Patient is independent, reliable, and has access to care.       Disposition and Care Coordination:    Discharged: The patient is suitable and stable for discharge with no need for consideration of observation or admission.    I have explained discharge medications and the need for follow up with the patient/caretakers. This was also printed in the discharge instructions. Patient was discharged with the following medications and follow up:      Medication List      New Prescriptions    metoclopramide 10 MG tablet  Commonly known as: REGLAN  Take 1 p.o. 3 times daily as needed nausea or headache.           Where to Get Your Medications      These medications were sent to [id number] [place] [address] - [phone]  - [phone] FX  [address], [place] [address]    Phone: [phone]   · metoclopramide 10 MG tablet      Victoria Russell, APRN  [address]  [place] [address]  [phone]    In 3 days  If no better.       Final diagnoses:   Adenopathy   Other migraine without status migrainosus, not intractable        ED Disposition     ED Disposition   Discharge    Condition   Stable    Comment   --             This medical record created using voice recognition software.           Lawrence Bryan,   03/31/23 1156

## 2023-03-31 NOTE — DISCHARGE INSTRUCTIONS
Take medication as directed.  Return for worsening symptoms.  Follow-up with your doctor on Monday.

## 2023-04-02 ENCOUNTER — HOSPITAL ENCOUNTER (EMERGENCY)
Facility: HOSPITAL | Age: 43
Discharge: HOME | End: 2023-04-02
Attending: EMERGENCY MEDICINE | Admitting: EMERGENCY MEDICINE
Payer: MEDICARE

## 2023-04-02 VITALS
WEIGHT: 186.51 LBS | DIASTOLIC BLOOD PRESSURE: 65 MMHG | TEMPERATURE: 98.6 F | RESPIRATION RATE: 16 BRPM | HEART RATE: 58 BPM | SYSTOLIC BLOOD PRESSURE: 94 MMHG | OXYGEN SATURATION: 93 % | BODY MASS INDEX: 31.07 KG/M2 | HEIGHT: 65 IN

## 2023-04-02 DIAGNOSIS — R51.9 ACUTE NONINTRACTABLE HEADACHE, UNSPECIFIED HEADACHE TYPE: Primary | ICD-10-CM

## 2023-04-02 DIAGNOSIS — R21 RASH: ICD-10-CM

## 2023-04-02 LAB
BASOPHILS # BLD AUTO: 0.05 10*3/MM3 (ref 0–0.2)
BASOPHILS NFR BLD AUTO: 0.3 % (ref 0–1.5)
DEPRECATED RDW RBC AUTO: 38.5 FL (ref 37–54)
EOSINOPHIL # BLD AUTO: 0.01 10*3/MM3 (ref 0–0.4)
EOSINOPHIL NFR BLD AUTO: 0.1 % (ref 0.3–6.2)
ERYTHROCYTE [DISTWIDTH] IN BLOOD BY AUTOMATED COUNT: 12.1 % (ref 12.3–15.4)
HCT VFR BLD AUTO: 37.2 % (ref 34–46.6)
HGB BLD-MCNC: 13.3 G/DL (ref 12–15.9)
HOLD SPECIMEN: NORMAL
HOLD SPECIMEN: NORMAL
IMM GRANULOCYTES # BLD AUTO: 0.04 10*3/MM3 (ref 0–0.05)
IMM GRANULOCYTES NFR BLD AUTO: 0.3 % (ref 0–0.5)
LYMPHOCYTES # BLD AUTO: 1.45 10*3/MM3 (ref 0.7–3.1)
LYMPHOCYTES NFR BLD AUTO: 10 % (ref 19.6–45.3)
MCH RBC QN AUTO: 30.9 PG (ref 26.6–33)
MCHC RBC AUTO-ENTMCNC: 35.8 G/DL (ref 31.5–35.7)
MCV RBC AUTO: 86.3 FL (ref 79–97)
MONOCYTES # BLD AUTO: 0.72 10*3/MM3 (ref 0.1–0.9)
MONOCYTES NFR BLD AUTO: 5 % (ref 5–12)
NEUTROPHILS NFR BLD AUTO: 12.25 10*3/MM3 (ref 1.7–7)
NEUTROPHILS NFR BLD AUTO: 84.3 % (ref 42.7–76)
NRBC BLD AUTO-RTO: 0 /100 WBC (ref 0–0.2)
PLATELET # BLD AUTO: 211 10*3/MM3 (ref 140–450)
PMV BLD AUTO: 8.8 FL (ref 6–12)
RBC # BLD AUTO: 4.31 10*6/MM3 (ref 3.77–5.28)
WBC NRBC COR # BLD: 14.52 10*3/MM3 (ref 3.4–10.8)
WHOLE BLOOD HOLD COAG: NORMAL

## 2023-04-02 PROCEDURE — 99283 EMERGENCY DEPT VISIT LOW MDM: CPT

## 2023-04-02 PROCEDURE — 25010000002 KETOROLAC TROMETHAMINE PER 15 MG: Performed by: EMERGENCY MEDICINE

## 2023-04-02 PROCEDURE — 25010000002 DIPHENHYDRAMINE PER 50 MG: Performed by: EMERGENCY MEDICINE

## 2023-04-02 PROCEDURE — 96375 TX/PRO/DX INJ NEW DRUG ADDON: CPT

## 2023-04-02 PROCEDURE — 25010000002 METOCLOPRAMIDE PER 10 MG: Performed by: EMERGENCY MEDICINE

## 2023-04-02 PROCEDURE — 85025 COMPLETE CBC W/AUTO DIFF WBC: CPT | Performed by: EMERGENCY MEDICINE

## 2023-04-02 PROCEDURE — 96374 THER/PROPH/DIAG INJ IV PUSH: CPT

## 2023-04-02 PROCEDURE — 25010000002 HYDROMORPHONE 1 MG/ML SOLUTION: Performed by: EMERGENCY MEDICINE

## 2023-04-02 RX ORDER — DIPHENHYDRAMINE HYDROCHLORIDE 50 MG/ML
25 INJECTION INTRAMUSCULAR; INTRAVENOUS ONCE
Status: COMPLETED | OUTPATIENT
Start: 2023-04-02 | End: 2023-04-02

## 2023-04-02 RX ORDER — METOCLOPRAMIDE HYDROCHLORIDE 5 MG/ML
10 INJECTION INTRAMUSCULAR; INTRAVENOUS ONCE
Status: COMPLETED | OUTPATIENT
Start: 2023-04-02 | End: 2023-04-02

## 2023-04-02 RX ORDER — KETOROLAC TROMETHAMINE 30 MG/ML
30 INJECTION, SOLUTION INTRAMUSCULAR; INTRAVENOUS ONCE
Status: COMPLETED | OUTPATIENT
Start: 2023-04-02 | End: 2023-04-02

## 2023-04-02 RX ORDER — SODIUM CHLORIDE 0.9 % (FLUSH) 0.9 %
10 SYRINGE (ML) INJECTION AS NEEDED
Status: DISCONTINUED | OUTPATIENT
Start: 2023-04-02 | End: 2023-04-02 | Stop reason: HOSPADM

## 2023-04-02 RX ADMIN — HYDROMORPHONE HYDROCHLORIDE 0.5 MG: 1 INJECTION, SOLUTION INTRAMUSCULAR; INTRAVENOUS; SUBCUTANEOUS at 12:26

## 2023-04-02 RX ADMIN — KETOROLAC TROMETHAMINE 30 MG: 30 INJECTION, SOLUTION INTRAMUSCULAR; INTRAVENOUS at 10:28

## 2023-04-02 RX ADMIN — DIPHENHYDRAMINE HYDROCHLORIDE 25 MG: 50 INJECTION, SOLUTION INTRAMUSCULAR; INTRAVENOUS at 10:28

## 2023-04-02 RX ADMIN — SODIUM CHLORIDE 1000 ML: 9 INJECTION, SOLUTION INTRAVENOUS at 10:25

## 2023-04-02 RX ADMIN — Medication 10 ML: at 09:41

## 2023-04-02 RX ADMIN — METOCLOPRAMIDE HYDROCHLORIDE 10 MG: 5 INJECTION INTRAMUSCULAR; INTRAVENOUS at 10:28

## 2023-04-02 NOTE — ED PROVIDER NOTES
"Time: 10:01 AM EDT  Date of encounter:  4/2/2023  Independent Historian/Clinical History and Information was obtained by: Patient and Chart  Chief Complaint: headache  History is limited by: N/A  Room number: 17/17     History of Present Illness:  HPI  Patient is a 43 y.o. year old female who presents to the Emergency Department via private car for evaluation of headache. Patient has no one at bedside on initial evaluation. Patient has a medical history of blood disease, anxiety, depression, hypothyroidism and migraine. Patient has a surgical history of cholecystectomy and hysterectomy. Patient has a social history of former substance user (marijuana).    Patient was evaluated at [facility] recently (03/31/2023) for headache symptoms. Patient had a head CT. Patient was treated and discharged.     Patient is still experiencing symptoms of generalized headache with photophobia and rash that started approximately four to five days ago. Patient indicates the rash is around her forehead. Patient describes the pain as \"sharp\". Patient states they are in severe pain and rates their pain level 8 out of 10 at rest and with movement or activity. Patient states no modifying factors. Patient denies symptoms of sinus pain, sinus pressure. All other systems reviews are negative.    Patient has tried prescription medication(s) Reglan with moderate relieve to symptoms. Patient denies use of new household products including hair products. Patient admits medical history of migraines but states this headache feels different from her normal migraine symptoms.    Patient Care Team  Primary Care Provider: Victoria Russell APRN    Past Medical History:  Allergies   Allergen Reactions   • Adhesive Tape Rash   • Morphine Other (See Comments)     CAUSES CHEST PAIN     Past Medical History:   Diagnosis Date   • Anxiety    • Bleeding disorder    • Blood disease    • Depression    • Disease of thyroid gland    • " Migraine    • Mood disorder    • Ovarian cyst    • Urinary tract infection      Past Surgical History:   Procedure Laterality Date   • COLONOSCOPY     • GALLBLADDER SURGERY     • HYSTERECTOMY     • PORTACATH PLACEMENT     • WISDOM TOOTH EXTRACTION       Family History   Problem Relation Age of Onset   • Stroke Father    • Heart disease Father    • Cancer Father    • Diabetes Mother    • Other Mother         blood diseases   • Diabetes Sister    • Kidney cancer Maternal Grandmother    • Breast cancer Neg Hx    • Colon cancer Neg Hx    • Ovarian cancer Neg Hx    • Uterine cancer Neg Hx    • Prostate cancer Neg Hx        Home Medications:  Prior to Admission medications    Medication Sig Start Date End Date Taking? Authorizing Provider   ARIPiprazole (ABILIFY) 5 MG tablet Abilify 5 mg oral tablet take 1 tablet (5 mg) by oral route once daily   Active    Debbie Linton MD   busPIRone (BUSPAR) 7.5 MG tablet  5/14/21   Debbie Linton MD   butalbital-acetaminophen-caffeine (FIORICET, ESGIC) -40 MG per tablet Take 1 tablet by mouth 4 (Four) Times a Day As Needed. 4/19/21   Debbie Linton MD   diclofenac (VOLTAREN) 75 MG EC tablet Take 75 mg by mouth 2 (Two) Times a Day As Needed. 4/27/21   Debbie Linton MD   estradiol (ESTRACE) 1 MG tablet Take 1 tablet by mouth Daily for 90 days. 3/28/22 6/26/22  Leatha Vilchis APRN   Estring 2 MG vaginal ring insert vaginally EVERY 90 DAYS 4/7/21   Debbie Linton MD   ibuprofen (ADVIL,MOTRIN) 800 MG tablet Take 800 mg by mouth 4 (Four) Times a Day As Needed. 4/19/21   Debbie Linton MD   levothyroxine (SYNTHROID, LEVOTHROID) 25 MCG tablet Take 25 mcg by mouth Daily. 10/17/22   Debbie Linton MD   metoclopramide (REGLAN) 10 MG tablet Take 1 p.o. 3 times daily as needed nausea or headache. 3/31/23   Lawrence Bryan,    phentermine (ADIPEX-P) 37.5 MG tablet Take 37.5 mg by mouth Daily. 10/17/22   Debbie Linton MD  "  venlafaxine XR (EFFEXOR-XR) 75 MG 24 hr capsule Effexor XR 75 mg oral capsule,extended release 24hr take 1 capsule (75 mg) by oral route once daily   Active    Provider, Debbie, MD        Social History:  Social History     Tobacco Use   • Smoking status: Never   • Smokeless tobacco: Never   • Tobacco comments:     01/01/1990   Vaping Use   • Vaping Use: Never used   Substance Use Topics   • Alcohol use: Not Currently   • Drug use: Yes     Types: Marijuana       Review of Systems:   Review of Systems   Constitutional: Negative.    HENT: Negative for sinus pressure and sinus pain.    Eyes: Positive for photophobia.   Respiratory: Negative.    Cardiovascular: Negative.    Gastrointestinal: Negative.    Endocrine: Negative.    Genitourinary: Negative.    Musculoskeletal: Negative.    Skin: Positive for rash (scalp).   Allergic/Immunologic: Negative.    Neurological: Positive for headaches.   Hematological: Negative.    Psychiatric/Behavioral: Negative.    All other systems reviewed and are negative.         Physical Exam:   BP 94/65   Pulse 58   Temp 98.6 °F (37 °C) (Oral)   Resp 16   Ht 165.1 cm (65\")   Wt 84.6 kg (186 lb 8.2 oz)   SpO2 93%   BMI 31.04 kg/m²     Physical Exam  Vitals and nursing note reviewed.   Constitutional:       General: She is not in acute distress.  HENT:      Head: Normocephalic and atraumatic.      Comments: No frontal or maxillary sinus tenderness.     Right Ear: Tympanic membrane, ear canal and external ear normal. No drainage or swelling. There is no impacted cerumen. Tympanic membrane is not erythematous or bulging.      Left Ear: Hearing, tympanic membrane, ear canal and external ear normal. No drainage or swelling. There is no impacted cerumen. Tympanic membrane is not erythematous or bulging.      Nose: Nose normal.      Mouth/Throat:      Lips: Pink.      Mouth: Mucous membranes are moist.   Eyes:      Extraocular Movements: Extraocular movements intact.   Cardiovascular: "      Rate and Rhythm: Normal rate and regular rhythm.      Heart sounds: Normal heart sounds.   Pulmonary:      Effort: Pulmonary effort is normal. No respiratory distress.      Breath sounds: Normal breath sounds.   Abdominal:      General: Abdomen is flat.      Palpations: Abdomen is soft.      Tenderness: There is no abdominal tenderness.   Musculoskeletal:         General: Normal range of motion.      Cervical back: Normal range of motion and neck supple.   Skin:     General: Skin is warm and dry.      Capillary Refill: Capillary refill takes less than 2 seconds.      Findings: Rash (forehead bilaterally) present. Rash is macular and papular.   Neurological:      Mental Status: She is alert and oriented to person, place, and time. Mental status is at baseline.                Procedures:  Procedures    Medical Decision Making:    Comorbidities that affect care:    blood disease, anxiety, depression, hypothyroidism, migraine, substance use (marijuana)    External Notes reviewed:    Previous ED Note: (03/31/2023)    The following orders were placed and all results were independently analyzed by me:  Orders Placed This Encounter   Procedures   • CBC Auto Differential   • CBC & Differential   • Green Top (Gel)   • Gold Top - SST   • Light Blue Top       Medications Given in the Emergency Department:  Medications   sodium chloride 0.9 % bolus 1,000 mL (0 mL Intravenous Stopped 4/2/23 1055)   ketorolac (TORADOL) injection 30 mg (30 mg Intravenous Given 4/2/23 1028)   metoclopramide (REGLAN) injection 10 mg (10 mg Intravenous Given 4/2/23 1028)   diphenhydrAMINE (BENADRYL) injection 25 mg (25 mg Intravenous Given 4/2/23 1028)   HYDROmorphone (DILAUDID) injection 0.5 mg (0.5 mg Intravenous Given 4/2/23 1226)       ED Course:       Labs:  Lab Results (last 24 hours)     ** No results found for the last 24 hours. **           Imaging:  No Radiology Exams Resulted Within Past 24 Hours    Differential Diagnosis and  Discussion:    Headache: Differential diagnosis includes but is not limited to migraine, cluster headache, hypertension, tumor, subarachnoid bleeding, pseudotumor cerebri, temporal arteritis, infections, tension headache, and TMJ syndrome.    All labs were reviewed and interpreted by me.    MDM         Patient Care Considerations:        Consultants/Shared Management Plan:    None    Social Determinants of Health:    Patient is independent, reliable, and has access to care.     Disposition and Care Coordination:    Discharged: The patient is suitable and stable for discharge with no need for consideration of observation or admission.        Final diagnoses:   Acute nonintractable headache, unspecified headache type   Rash        ED Disposition     ED Disposition   Discharge    Condition   Stable    Comment   --             This medical record created using voice recognition software.    Documentation assistance provided by Babita Mart acting as scribes for Mikael Spivey DO.Information recorded by the scribe was verified and validated at my direction.     Babita Mart  04/02/23 1012       Babita Mart  04/02/23 1013       Babita Mart  04/02/23 1014       Mikael Spivey DO  04/08/23 1251

## 2023-04-02 NOTE — ED NOTES
Pt reports headache seen here Friday for the same and now has a red rash on her scalp, pt reports she has not been out of bed since Friday. Pt is alert and oriented in no distress. She denies using any new products on her hair

## 2023-04-04 ENCOUNTER — HOSPITAL ENCOUNTER (EMERGENCY)
Facility: HOSPITAL | Age: 43
Discharge: HOME | End: 2023-04-04
Attending: STUDENT IN AN ORGANIZED HEALTH CARE EDUCATION/TRAINING PROGRAM | Admitting: STUDENT IN AN ORGANIZED HEALTH CARE EDUCATION/TRAINING PROGRAM
Payer: MEDICARE

## 2023-04-04 VITALS
HEART RATE: 64 BPM | SYSTOLIC BLOOD PRESSURE: 95 MMHG | DIASTOLIC BLOOD PRESSURE: 64 MMHG | HEIGHT: 61 IN | BODY MASS INDEX: 31.15 KG/M2 | WEIGHT: 165 LBS | RESPIRATION RATE: 18 BRPM | OXYGEN SATURATION: 96 %

## 2023-04-04 DIAGNOSIS — L03.213 PRESEPTAL CELLULITIS OF LEFT EYE: Primary | ICD-10-CM

## 2023-04-04 DIAGNOSIS — R51.9 ACUTE NONINTRACTABLE HEADACHE, UNSPECIFIED HEADACHE TYPE: ICD-10-CM

## 2023-04-04 LAB
ALBUMIN SERPL-MCNC: 3.2 G/DL (ref 3.5–5.2)
ALBUMIN/GLOB SERPL: 0.9 G/DL
ALP SERPL-CCNC: 89 U/L (ref 39–117)
ALT SERPL W P-5'-P-CCNC: 14 U/L (ref 1–33)
ANION GAP SERPL CALCULATED.3IONS-SCNC: 6.3 MMOL/L (ref 5–15)
AST SERPL-CCNC: 14 U/L (ref 1–32)
BASOPHILS # BLD AUTO: 0.02 10*3/MM3 (ref 0–0.2)
BASOPHILS NFR BLD AUTO: 0.2 % (ref 0–1.5)
BILIRUB SERPL-MCNC: 0.2 MG/DL (ref 0–1.2)
BUN SERPL-MCNC: 4 MG/DL (ref 6–20)
BUN/CREAT SERPL: 9.1 (ref 7–25)
CALCIUM SPEC-SCNC: 8.7 MG/DL (ref 8.6–10.5)
CHLORIDE SERPL-SCNC: 99 MMOL/L (ref 98–107)
CO2 SERPL-SCNC: 32.7 MMOL/L (ref 22–29)
CREAT SERPL-MCNC: 0.44 MG/DL (ref 0.57–1)
DEPRECATED RDW RBC AUTO: 39.3 FL (ref 37–54)
EGFRCR SERPLBLD CKD-EPI 2021: 124 ML/MIN/1.73
EOSINOPHIL # BLD AUTO: 0.06 10*3/MM3 (ref 0–0.4)
EOSINOPHIL NFR BLD AUTO: 0.7 % (ref 0.3–6.2)
ERYTHROCYTE [DISTWIDTH] IN BLOOD BY AUTOMATED COUNT: 12.2 % (ref 12.3–15.4)
GLOBULIN UR ELPH-MCNC: 3.6 GM/DL
GLUCOSE SERPL-MCNC: 84 MG/DL (ref 65–99)
HCT VFR BLD AUTO: 35.4 % (ref 34–46.6)
HGB BLD-MCNC: 12.5 G/DL (ref 12–15.9)
IMM GRANULOCYTES # BLD AUTO: 0.02 10*3/MM3 (ref 0–0.05)
IMM GRANULOCYTES NFR BLD AUTO: 0.2 % (ref 0–0.5)
LYMPHOCYTES # BLD AUTO: 1.3 10*3/MM3 (ref 0.7–3.1)
LYMPHOCYTES NFR BLD AUTO: 14.8 % (ref 19.6–45.3)
MCH RBC QN AUTO: 30.6 PG (ref 26.6–33)
MCHC RBC AUTO-ENTMCNC: 35.3 G/DL (ref 31.5–35.7)
MCV RBC AUTO: 86.8 FL (ref 79–97)
MONOCYTES # BLD AUTO: 0.61 10*3/MM3 (ref 0.1–0.9)
MONOCYTES NFR BLD AUTO: 6.9 % (ref 5–12)
NEUTROPHILS NFR BLD AUTO: 6.79 10*3/MM3 (ref 1.7–7)
NEUTROPHILS NFR BLD AUTO: 77.2 % (ref 42.7–76)
NRBC BLD AUTO-RTO: 0 /100 WBC (ref 0–0.2)
PLATELET # BLD AUTO: 244 10*3/MM3 (ref 140–450)
PMV BLD AUTO: 9 FL (ref 6–12)
POTASSIUM SERPL-SCNC: 3.1 MMOL/L (ref 3.5–5.2)
PROT SERPL-MCNC: 6.8 G/DL (ref 6–8.5)
RBC # BLD AUTO: 4.08 10*6/MM3 (ref 3.77–5.28)
SODIUM SERPL-SCNC: 138 MMOL/L (ref 136–145)
WBC NRBC COR # BLD: 8.8 10*3/MM3 (ref 3.4–10.8)

## 2023-04-04 PROCEDURE — 99283 EMERGENCY DEPT VISIT LOW MDM: CPT

## 2023-04-04 PROCEDURE — 25010000002 DIPHENHYDRAMINE PER 50 MG: Performed by: STUDENT IN AN ORGANIZED HEALTH CARE EDUCATION/TRAINING PROGRAM

## 2023-04-04 PROCEDURE — 87040 BLOOD CULTURE FOR BACTERIA: CPT | Performed by: STUDENT IN AN ORGANIZED HEALTH CARE EDUCATION/TRAINING PROGRAM

## 2023-04-04 PROCEDURE — 25010000002 CEFAZOLIN IN DEXTROSE 2-4 GM/100ML-% SOLUTION: Performed by: STUDENT IN AN ORGANIZED HEALTH CARE EDUCATION/TRAINING PROGRAM

## 2023-04-04 PROCEDURE — 36415 COLL VENOUS BLD VENIPUNCTURE: CPT

## 2023-04-04 PROCEDURE — 96375 TX/PRO/DX INJ NEW DRUG ADDON: CPT

## 2023-04-04 PROCEDURE — 85025 COMPLETE CBC W/AUTO DIFF WBC: CPT | Performed by: STUDENT IN AN ORGANIZED HEALTH CARE EDUCATION/TRAINING PROGRAM

## 2023-04-04 PROCEDURE — 25010000002 KETOROLAC TROMETHAMINE PER 15 MG: Performed by: STUDENT IN AN ORGANIZED HEALTH CARE EDUCATION/TRAINING PROGRAM

## 2023-04-04 PROCEDURE — 80053 COMPREHEN METABOLIC PANEL: CPT | Performed by: STUDENT IN AN ORGANIZED HEALTH CARE EDUCATION/TRAINING PROGRAM

## 2023-04-04 PROCEDURE — 96365 THER/PROPH/DIAG IV INF INIT: CPT

## 2023-04-04 PROCEDURE — 25010000002 METOCLOPRAMIDE PER 10 MG: Performed by: STUDENT IN AN ORGANIZED HEALTH CARE EDUCATION/TRAINING PROGRAM

## 2023-04-04 PROCEDURE — 25010000002 DEXAMETHASONE SODIUM PHOSPHATE 10 MG/ML SOLUTION: Performed by: STUDENT IN AN ORGANIZED HEALTH CARE EDUCATION/TRAINING PROGRAM

## 2023-04-04 RX ORDER — CEPHALEXIN 500 MG/1
500 CAPSULE ORAL 2 TIMES DAILY
Qty: 14 CAPSULE | Refills: 0 | Status: SHIPPED | OUTPATIENT
Start: 2023-04-04 | End: 2023-04-11

## 2023-04-04 RX ORDER — SULFAMETHOXAZOLE AND TRIMETHOPRIM 800; 160 MG/1; MG/1
1 TABLET ORAL ONCE
Status: COMPLETED | OUTPATIENT
Start: 2023-04-04 | End: 2023-04-04

## 2023-04-04 RX ORDER — FLUCONAZOLE 150 MG/1
150 TABLET ORAL ONCE
Qty: 1 TABLET | Refills: 0 | Status: SHIPPED | OUTPATIENT
Start: 2023-04-04 | End: 2023-04-04

## 2023-04-04 RX ORDER — KETOROLAC TROMETHAMINE 15 MG/ML
15 INJECTION, SOLUTION INTRAMUSCULAR; INTRAVENOUS ONCE
Status: COMPLETED | OUTPATIENT
Start: 2023-04-04 | End: 2023-04-04

## 2023-04-04 RX ORDER — DEXAMETHASONE SODIUM PHOSPHATE 10 MG/ML
10 INJECTION, SOLUTION INTRAMUSCULAR; INTRAVENOUS ONCE
Status: COMPLETED | OUTPATIENT
Start: 2023-04-04 | End: 2023-04-04

## 2023-04-04 RX ORDER — DIPHENHYDRAMINE HYDROCHLORIDE 50 MG/ML
25 INJECTION INTRAMUSCULAR; INTRAVENOUS ONCE
Status: COMPLETED | OUTPATIENT
Start: 2023-04-04 | End: 2023-04-04

## 2023-04-04 RX ORDER — METOCLOPRAMIDE HYDROCHLORIDE 5 MG/ML
10 INJECTION INTRAMUSCULAR; INTRAVENOUS ONCE
Status: COMPLETED | OUTPATIENT
Start: 2023-04-04 | End: 2023-04-04

## 2023-04-04 RX ORDER — CEFAZOLIN SODIUM 2 G/100ML
2 INJECTION, SOLUTION INTRAVENOUS ONCE
Status: COMPLETED | OUTPATIENT
Start: 2023-04-04 | End: 2023-04-04

## 2023-04-04 RX ORDER — SULFAMETHOXAZOLE AND TRIMETHOPRIM 800; 160 MG/1; MG/1
1 TABLET ORAL 2 TIMES DAILY
Qty: 14 TABLET | Refills: 0 | Status: SHIPPED | OUTPATIENT
Start: 2023-04-04 | End: 2023-04-11

## 2023-04-04 RX ADMIN — SULFAMETHOXAZOLE AND TRIMETHOPRIM 1 TABLET: 800; 160 TABLET ORAL at 10:50

## 2023-04-04 RX ADMIN — CEFAZOLIN SODIUM 2 G: 2 INJECTION, SOLUTION INTRAVENOUS at 10:46

## 2023-04-04 RX ADMIN — METOCLOPRAMIDE 10 MG: 5 INJECTION, SOLUTION INTRAMUSCULAR; INTRAVENOUS at 10:37

## 2023-04-04 RX ADMIN — DIPHENHYDRAMINE HYDROCHLORIDE 25 MG: 50 INJECTION, SOLUTION INTRAMUSCULAR; INTRAVENOUS at 10:40

## 2023-04-04 RX ADMIN — DEXAMETHASONE SODIUM PHOSPHATE 10 MG: 10 INJECTION INTRAMUSCULAR; INTRAVENOUS at 10:43

## 2023-04-04 RX ADMIN — KETOROLAC TROMETHAMINE 15 MG: 15 INJECTION, SOLUTION INTRAMUSCULAR; INTRAVENOUS at 10:39

## 2023-04-04 RX ADMIN — SODIUM CHLORIDE 1000 ML: 9 INJECTION, SOLUTION INTRAVENOUS at 10:35

## 2023-04-04 NOTE — DISCHARGE INSTRUCTIONS
If you develop fever, eye discharge, or pain with eye movement, return to the emergency department.

## 2023-04-04 NOTE — ED PROVIDER NOTES
Time: 9:53 AM EDT  Date of encounter:  4/4/2023  Independent Historian/Clinical History and Information was obtained by: Patient and Chart  Chief Complaint: headache  History is limited by: N/A  Room number: 33/33     History of Present Illness:  HPI  Patient is a 43 y.o. year old female who presents to the Emergency Department via private car for evaluation of headache. Patient has no one at bedside on initial evaluation. Patient has a medical history of hearing aids (bilateral), blood disease (dysfunctional platelets requiring infusion), anxiety, depression, hypothyroidism and migraine. Patient has a surgical history of cholecystectomy and hysterectomy. Patient has a social history of current substance (marijuana) user and former substance user (methamphetamines).    Patient states she is recently recovering from influenza infection a couple of weeks ago and then she was evaluated at [facility] several times (03/31/2023 and 04.02/2023) for migraine symptoms that started Friday. Patient had head CT. Patient was treated and then discharged with prescription medication.    Patient is experiencing symptoms of headache. with cough, eye pain, skin rash and neck pain that started approximately one week ago. Patient states her cough is productive with green sputum and describes her headache pain as bilateral. Patient states they are in moderate pain and rates their pain level 7 out of 10 at rest and with movement or activity. Patient states no modifying factors. Patient denies all other symptoms. All other systems reviews are negative.    Patient has tried prescription medication(s) Reglan.     Patient Care Team  Primary Care Provider: Victoria Russell APRN    Past Medical History:  Allergies   Allergen Reactions   • Adhesive Tape Rash   • Morphine Other (See Comments)     CAUSES CHEST PAIN     Past Medical History:   Diagnosis Date   • Anxiety    • Bleeding disorder    • Blood disease    •  Depression    • Disease of thyroid gland    • Migraine    • Mood disorder    • Ovarian cyst    • Urinary tract infection      Past Surgical History:   Procedure Laterality Date   • COLONOSCOPY     • GALLBLADDER SURGERY     • HYSTERECTOMY     • PORTACATH PLACEMENT     • WISDOM TOOTH EXTRACTION       Family History   Problem Relation Age of Onset   • Stroke Father    • Heart disease Father    • Cancer Father    • Diabetes Mother    • Other Mother         blood diseases   • Diabetes Sister    • Kidney cancer Maternal Grandmother    • Breast cancer Neg Hx    • Colon cancer Neg Hx    • Ovarian cancer Neg Hx    • Uterine cancer Neg Hx    • Prostate cancer Neg Hx        Home Medications:  Prior to Admission medications    Medication Sig Start Date End Date Taking? Authorizing Provider   ARIPiprazole (ABILIFY) 5 MG tablet Abilify 5 mg oral tablet take 1 tablet (5 mg) by oral route once daily   Active    ProviderDebbie MD   busPIRone (BUSPAR) 7.5 MG tablet  5/14/21   Debbie Linton MD   butalbital-acetaminophen-caffeine (FIORICET, ESGIC) -40 MG per tablet Take 1 tablet by mouth 4 (Four) Times a Day As Needed. 4/19/21   Debbie Linton MD   diclofenac (VOLTAREN) 75 MG EC tablet Take 75 mg by mouth 2 (Two) Times a Day As Needed. 4/27/21   Debbie Linton MD   estradiol (ESTRACE) 1 MG tablet Take 1 tablet by mouth Daily for 90 days. 3/28/22 6/26/22  Leatha Vilchis APRN   Estring 2 MG vaginal ring insert vaginally EVERY 90 DAYS 4/7/21   Debbie Linton MD   ibuprofen (ADVIL,MOTRIN) 800 MG tablet Take 800 mg by mouth 4 (Four) Times a Day As Needed. 4/19/21   Debbie Linton MD   levothyroxine (SYNTHROID, LEVOTHROID) 25 MCG tablet Take 25 mcg by mouth Daily. 10/17/22   Debbie Linton MD   metoclopramide (REGLAN) 10 MG tablet Take 1 p.o. 3 times daily as needed nausea or headache. 3/31/23   Lawrence Bryan,    phentermine (ADIPEX-P) 37.5 MG tablet Take 37.5 mg by mouth  "Daily. 10/17/22   ProviderDebbie MD   venlafaxine XR (EFFEXOR-XR) 75 MG 24 hr capsule Effexor XR 75 mg oral capsule,extended release 24hr take 1 capsule (75 mg) by oral route once daily   Active    Provider, MD Debbie        Social History:  Social History     Tobacco Use   • Smoking status: Never   • Smokeless tobacco: Never   • Tobacco comments:     01/01/1990   Vaping Use   • Vaping Use: Never used   Substance Use Topics   • Alcohol use: Not Currently   • Drug use: Yes     Types: Marijuana       Review of Systems:   Review of Systems   Constitutional: Negative.    Eyes: Positive for pain and redness.   Respiratory: Positive for cough (productive with green sputum).    Cardiovascular: Negative.    Gastrointestinal: Negative.    Endocrine: Negative.    Genitourinary: Negative.    Musculoskeletal: Positive for neck pain.   Skin: Negative.    Allergic/Immunologic: Negative.    Neurological: Positive for headaches (bilateral).   Hematological: Negative.    Psychiatric/Behavioral: Negative.    All other systems reviewed and are negative.         Physical Exam:   BP 95/64   Pulse 64   Resp 18   Ht 154.9 cm (61\")   Wt 74.8 kg (165 lb)   SpO2 96%   BMI 31.18 kg/m²     Physical Exam  Vitals and nursing note reviewed.   Constitutional:       General: She is not in acute distress.     Appearance: Normal appearance. She is not toxic-appearing.   HENT:      Head: Normocephalic and atraumatic. Right periorbital erythema and left periorbital erythema present.      Jaw: There is normal jaw occlusion.      Comments: Bilateral erythema with patches of erythema around the face. There is swelling of the left, but no discharge noted. Extraocular eye movements intact and without pain.     Right Ear: External ear normal.      Left Ear: External ear normal.      Nose: Nose normal.      Mouth/Throat:      Lips: Pink.      Mouth: Mucous membranes are moist.   Eyes:      General: Lids are normal.      Extraocular " Movements: Extraocular movements intact.      Conjunctiva/sclera: Conjunctivae normal.      Pupils: Pupils are equal, round, and reactive to light.   Cardiovascular:      Rate and Rhythm: Normal rate and regular rhythm.      Pulses: Normal pulses.      Heart sounds: Normal heart sounds.   Pulmonary:      Effort: Pulmonary effort is normal. No respiratory distress.      Breath sounds: Normal breath sounds. No wheezing or rhonchi.   Abdominal:      General: Abdomen is flat.      Palpations: Abdomen is soft.      Tenderness: There is no abdominal tenderness. There is no guarding or rebound.   Musculoskeletal:         General: Normal range of motion.      Cervical back: Normal range of motion and neck supple.      Right lower leg: No edema.      Left lower leg: No edema.   Skin:     General: Skin is warm and dry.      Comments: See EDMAR for skin exam of face   Neurological:      Mental Status: She is alert and oriented to person, place, and time. Mental status is at baseline.   Psychiatric:         Mood and Affect: Mood normal.                Procedures:  Procedures    Medical Decision Making:    Comorbidities that affect care:    hearing aids (bilateral), blood disease (dysfunctional platelets requiring infusion), anxiety, depression, hypothyroidism and migraine    External Notes reviewed:    Previous ED Note: [facility] (04/01/2023) and (03/31/2023)     The following orders were placed and all results were independently analyzed by me:  Orders Placed This Encounter   Procedures   • Blood Culture - Blood,   • Blood Culture - Blood,   • Comprehensive Metabolic Panel   • CBC Auto Differential   • CBC & Differential       Medications Given in the Emergency Department:  Medications   ceFAZolin in dextrose (ANCEF) IVPB solution 2 g (2 g Intravenous New Bag 4/4/23 1046)   sulfamethoxazole-trimethoprim (BACTRIM DS,SEPTRA DS) 800-160 MG per tablet 1 tablet (1 tablet Oral Given 4/4/23 1050)   sodium chloride 0.9 % bolus  1,000 mL (1,000 mL Intravenous New Bag 4/4/23 1035)   diphenhydrAMINE (BENADRYL) injection 25 mg (25 mg Intravenous Given 4/4/23 1040)   metoclopramide (REGLAN) injection 10 mg (10 mg Intravenous Given 4/4/23 1037)   ketorolac (TORADOL) injection 15 mg (15 mg Intravenous Given 4/4/23 1039)   dexamethasone sodium phosphate injection 10 mg (10 mg Intravenous Given 4/4/23 1043)       ED Course:       Labs:  Lab Results (last 24 hours)     Procedure Component Value Units Date/Time    Comprehensive Metabolic Panel [[id number]]  (Abnormal) Collected: 04/04/23 1034    Specimen: Blood Updated: 04/04/23 1113     Glucose 84 mg/dL      BUN 4 mg/dL      Creatinine 0.44 mg/dL      Sodium 138 mmol/L      Potassium 3.1 mmol/L      Chloride 99 mmol/L      CO2 32.7 mmol/L      Calcium 8.7 mg/dL      Total Protein 6.8 g/dL      Albumin 3.2 g/dL      ALT (SGPT) 14 U/L      AST (SGOT) 14 U/L      Alkaline Phosphatase 89 U/L      Total Bilirubin 0.2 mg/dL      Globulin 3.6 gm/dL      A/G Ratio 0.9 g/dL      BUN/Creatinine Ratio 9.1     Anion Gap 6.3 mmol/L      eGFR 124.0 mL/min/1.73     Narrative:      GFR Normal >60  Chronic Kidney Disease <60  Kidney Failure <15      CBC & Differential [[id number]]  (Abnormal) Collected: 04/04/23 1034    Specimen: Blood Updated: 04/04/23 1054    Narrative:      The following orders were created for panel order CBC & Differential.  Procedure                               Abnormality         Status                     ---------                               -----------         ------                     CBC Auto Differential[[id number]]        Abnormal            Final result                 Please view results for these tests on the individual orders.    Blood Culture - Blood, Arm, Right [[id number]] Collected: 04/04/23 1034    Specimen: Blood from Arm, Right Updated: 04/04/23 1049    CBC Auto Differential [[id number]]  (Abnormal) Collected: 04/04/23 1034    Specimen: Blood Updated: 04/04/23 1054     WBC  8.80 10*3/mm3      RBC 4.08 10*6/mm3      Hemoglobin 12.5 g/dL      Hematocrit 35.4 %      MCV 86.8 fL      MCH 30.6 pg      MCHC 35.3 g/dL      RDW 12.2 %      RDW-SD 39.3 fl      MPV 9.0 fL      Platelets 244 10*3/mm3      Neutrophil % 77.2 %      Lymphocyte % 14.8 %      Monocyte % 6.9 %      Eosinophil % 0.7 %      Basophil % 0.2 %      Immature Grans % 0.2 %      Neutrophils, Absolute 6.79 10*3/mm3      Lymphocytes, Absolute 1.30 10*3/mm3      Monocytes, Absolute 0.61 10*3/mm3      Eosinophils, Absolute 0.06 10*3/mm3      Basophils, Absolute 0.02 10*3/mm3      Immature Grans, Absolute 0.02 10*3/mm3      nRBC 0.0 /100 WBC     Blood Culture - Blood, Arm, Left [[id number]] Collected: 04/04/23 1043    Specimen: Blood from Arm, Left Updated: 04/04/23 1049           Imaging:  No Radiology Exams Resulted Within Past 24 Hours    Differential Diagnosis and Discussion:    Headache: Differential diagnosis includes but is not limited to migraine, cluster headache, hypertension, tumor, subarachnoid bleeding, pseudotumor cerebri, temporal arteritis, infections, tension headache, and TMJ syndrome.    All labs were reviewed and interpreted by me.    MDM     Patient's symptoms appear to be a preseptal cellulitis.  She was given cefazolin steroids and Bactrim in the emergency department.  She was observed and there was improvement in the swelling.  Low suspicion for an orbital cellulitis at this time.  She recently had a CT scan of her head which was normal.  Her headache improved with treatment at the emergency department.  Patient was prescribed antibiotics and given return precautions.    Patient Care Considerations:    I considered a CT of the head but patient's symptoms currently point to a preseptal cellulitis.    Consultants/Shared Management Plan:    None    Social Determinants of Health:    Patient is independent, reliable, and has access to care.     Disposition and Care Coordination:    Discharged: The patient is  suitable and stable for discharge with no need for consideration of observation or admission.    I have explained discharge medications and the need for follow up with the patient/caretakers. This was also printed in the discharge instructions. Patient was discharged with the following medications and follow up:      Medication List      New Prescriptions    cephalexin 500 MG capsule  Commonly known as: KEFLEX  Take 1 capsule by mouth 2 (Two) Times a Day for 7 days.     sulfamethoxazole-trimethoprim 800-160 MG per tablet  Commonly known as: BACTRIM DS,SEPTRA DS  Take 1 tablet by mouth 2 (Two) Times a Day for 7 days.           Where to Get Your Medications      These [place] [facility] [place] [address] - [phone] [facility] [phone] FX  [address] [facility] [address], [place] [address]    Phone: [phone]   · cephalexin 500 MG capsule  · sulfamethoxazole-trimethoprim 800-160 MG per tablet      Victoria Russell, APRN  [address]  [place] [address]  [phone]             Final diagnoses:   Preseptal cellulitis of left eye   Acute nonintractable headache, unspecified headache type        ED Disposition     ED Disposition   Discharge    Condition   Stable    Comment   --             This medical record created using voice recognition software.    Documentation assistance provided by Babita Mart acting as scribes for Erica Moses MD.Information recorded by the scribe was verified and validated at my direction.     Babita Mart  04/04/23 1016       Erica Moses MD  04/04/23 0777

## 2023-04-06 ENCOUNTER — TRANSCRIBE ORDERS (OUTPATIENT)
Dept: ADMINISTRATIVE | Facility: HOSPITAL | Age: 43
End: 2023-04-06
Payer: MEDICARE

## 2023-04-06 DIAGNOSIS — D69.1 DYSFUNCTIONAL PLATELETS: Primary | ICD-10-CM

## 2023-04-09 LAB
BACTERIA SPEC AEROBE CULT: NORMAL
BACTERIA SPEC AEROBE CULT: NORMAL

## 2023-04-18 ENCOUNTER — HOSPITAL ENCOUNTER (OUTPATIENT)
Dept: INFUSION THERAPY | Facility: HOSPITAL | Age: 43
Discharge: HOME | End: 2023-04-18
Admitting: INTERNAL MEDICINE
Payer: MEDICARE

## 2023-04-18 VITALS
HEIGHT: 65 IN | HEART RATE: 72 BPM | DIASTOLIC BLOOD PRESSURE: 68 MMHG | WEIGHT: 187.39 LBS | SYSTOLIC BLOOD PRESSURE: 105 MMHG | TEMPERATURE: 98 F | RESPIRATION RATE: 18 BRPM | BODY MASS INDEX: 31.22 KG/M2 | OXYGEN SATURATION: 100 %

## 2023-04-18 DIAGNOSIS — Z95.828 PORT-A-CATH IN PLACE: Primary | ICD-10-CM

## 2023-04-18 DIAGNOSIS — D69.1 QUALITATIVE PLATELET DISORDER: ICD-10-CM

## 2023-04-18 LAB
ABO GROUP BLD: NORMAL
BASOPHILS # BLD AUTO: 0.03 10*3/MM3 (ref 0–0.2)
BASOPHILS NFR BLD AUTO: 0.4 % (ref 0–1.5)
BLD GP AB SCN SERPL QL: NEGATIVE
DEPRECATED RDW RBC AUTO: 43 FL (ref 37–54)
EOSINOPHIL # BLD AUTO: 0.07 10*3/MM3 (ref 0–0.4)
EOSINOPHIL NFR BLD AUTO: 1 % (ref 0.3–6.2)
ERYTHROCYTE [DISTWIDTH] IN BLOOD BY AUTOMATED COUNT: 13.2 % (ref 12.3–15.4)
HCT VFR BLD AUTO: 38.1 % (ref 34–46.6)
HGB BLD-MCNC: 13.2 G/DL (ref 12–15.9)
IMM GRANULOCYTES # BLD AUTO: 0.01 10*3/MM3 (ref 0–0.05)
IMM GRANULOCYTES NFR BLD AUTO: 0.1 % (ref 0–0.5)
LYMPHOCYTES # BLD AUTO: 1.81 10*3/MM3 (ref 0.7–3.1)
LYMPHOCYTES NFR BLD AUTO: 27.1 % (ref 19.6–45.3)
MCH RBC QN AUTO: 31.1 PG (ref 26.6–33)
MCHC RBC AUTO-ENTMCNC: 34.6 G/DL (ref 31.5–35.7)
MCV RBC AUTO: 89.6 FL (ref 79–97)
MONOCYTES # BLD AUTO: 0.26 10*3/MM3 (ref 0.1–0.9)
MONOCYTES NFR BLD AUTO: 3.9 % (ref 5–12)
NEUTROPHILS NFR BLD AUTO: 4.51 10*3/MM3 (ref 1.7–7)
NEUTROPHILS NFR BLD AUTO: 67.5 % (ref 42.7–76)
NRBC BLD AUTO-RTO: 0 /100 WBC (ref 0–0.2)
PLATELET # BLD AUTO: 273 10*3/MM3 (ref 140–450)
PMV BLD AUTO: 8.5 FL (ref 6–12)
RBC # BLD AUTO: 4.25 10*6/MM3 (ref 3.77–5.28)
RH BLD: POSITIVE
T&S EXPIRATION DATE: NORMAL
WBC NRBC COR # BLD: 6.69 10*3/MM3 (ref 3.4–10.8)

## 2023-04-18 PROCEDURE — 86901 BLOOD TYPING SEROLOGIC RH(D): CPT | Performed by: INTERNAL MEDICINE

## 2023-04-18 PROCEDURE — 85025 COMPLETE CBC W/AUTO DIFF WBC: CPT | Performed by: INTERNAL MEDICINE

## 2023-04-18 PROCEDURE — P9035 PLATELET PHERES LEUKOREDUCED: HCPCS

## 2023-04-18 PROCEDURE — P9100 PATHOGEN TEST FOR PLATELETS: HCPCS

## 2023-04-18 PROCEDURE — 25010000002 HEPARIN LOCK FLUSH PER 10 UNITS: Performed by: INTERNAL MEDICINE

## 2023-04-18 PROCEDURE — P9037 PLATE PHERES LEUKOREDU IRRAD: HCPCS

## 2023-04-18 PROCEDURE — 86850 RBC ANTIBODY SCREEN: CPT | Performed by: INTERNAL MEDICINE

## 2023-04-18 PROCEDURE — 36430 TRANSFUSION BLD/BLD COMPNT: CPT

## 2023-04-18 PROCEDURE — 86900 BLOOD TYPING SEROLOGIC ABO: CPT | Performed by: INTERNAL MEDICINE

## 2023-04-18 RX ORDER — HEPARIN SODIUM (PORCINE) LOCK FLUSH IV SOLN 100 UNIT/ML 100 UNIT/ML
500 SOLUTION INTRAVENOUS AS NEEDED
OUTPATIENT
Start: 2023-04-18

## 2023-04-18 RX ORDER — HEPARIN SODIUM (PORCINE) LOCK FLUSH IV SOLN 100 UNIT/ML 100 UNIT/ML
500 SOLUTION INTRAVENOUS AS NEEDED
Status: DISCONTINUED | OUTPATIENT
Start: 2023-04-18 | End: 2023-04-20 | Stop reason: HOSPADM

## 2023-04-18 RX ADMIN — Medication 500 UNITS: at 10:44

## 2023-04-19 LAB
BH BB BLOOD EXPIRATION DATE: NORMAL
BH BB BLOOD TYPE BARCODE: 9500
BH BB DISPENSE STATUS: NORMAL
BH BB PRODUCT CODE: NORMAL
BH BB UNIT NUMBER: NORMAL
UNIT  ABO: NORMAL
UNIT  RH: NORMAL

## 2023-05-23 ENCOUNTER — TRANSCRIBE ORDERS (OUTPATIENT)
Dept: ADMINISTRATIVE | Facility: HOSPITAL | Age: 43
End: 2023-05-23
Payer: MEDICARE

## 2023-05-23 DIAGNOSIS — D69.1 DYSFUNCTIONAL PLATELETS: Primary | ICD-10-CM

## 2023-05-24 ENCOUNTER — PREP FOR SURGERY (OUTPATIENT)
Dept: OTHER | Facility: HOSPITAL | Age: 43
End: 2023-05-24

## 2023-05-24 RX ORDER — ACETAMINOPHEN 650 MG/1
650 SUPPOSITORY RECTAL ONCE
Status: CANCELLED | OUTPATIENT
Start: 2023-05-24

## 2023-05-24 RX ORDER — ACETAMINOPHEN 325 MG/1
650 TABLET ORAL ONCE
Status: CANCELLED | OUTPATIENT
Start: 2023-05-24 | End: 2023-05-24

## 2023-05-24 RX ORDER — ACETAMINOPHEN 160 MG/5ML
650 SOLUTION ORAL ONCE
Status: CANCELLED | OUTPATIENT
Start: 2023-05-24

## 2023-05-25 ENCOUNTER — HOSPITAL ENCOUNTER (OUTPATIENT)
Dept: INFUSION THERAPY | Facility: HOSPITAL | Age: 43
Discharge: HOME | End: 2023-05-25
Admitting: INTERNAL MEDICINE
Payer: MEDICARE

## 2023-05-25 VITALS
BODY MASS INDEX: 30.9 KG/M2 | SYSTOLIC BLOOD PRESSURE: 122 MMHG | HEIGHT: 66 IN | WEIGHT: 192.24 LBS | TEMPERATURE: 98.1 F | HEART RATE: 80 BPM | RESPIRATION RATE: 18 BRPM | OXYGEN SATURATION: 100 % | DIASTOLIC BLOOD PRESSURE: 70 MMHG

## 2023-05-25 DIAGNOSIS — D69.1 QUALITATIVE PLATELET DISORDER: ICD-10-CM

## 2023-05-25 DIAGNOSIS — Z95.828 PORT-A-CATH IN PLACE: Primary | ICD-10-CM

## 2023-05-25 LAB
ABO GROUP BLD: NORMAL
BASOPHILS # BLD AUTO: 0.02 10*3/MM3 (ref 0–0.2)
BASOPHILS NFR BLD AUTO: 0.3 % (ref 0–1.5)
BLD GP AB SCN SERPL QL: NEGATIVE
DEPRECATED RDW RBC AUTO: 44.6 FL (ref 37–54)
EOSINOPHIL # BLD AUTO: 0.09 10*3/MM3 (ref 0–0.4)
EOSINOPHIL NFR BLD AUTO: 1.5 % (ref 0.3–6.2)
ERYTHROCYTE [DISTWIDTH] IN BLOOD BY AUTOMATED COUNT: 13.5 % (ref 12.3–15.4)
HCT VFR BLD AUTO: 39.7 % (ref 34–46.6)
HGB BLD-MCNC: 13.7 G/DL (ref 12–15.9)
IMM GRANULOCYTES # BLD AUTO: 0.01 10*3/MM3 (ref 0–0.05)
IMM GRANULOCYTES NFR BLD AUTO: 0.2 % (ref 0–0.5)
LYMPHOCYTES # BLD AUTO: 2.5 10*3/MM3 (ref 0.7–3.1)
LYMPHOCYTES NFR BLD AUTO: 41.7 % (ref 19.6–45.3)
MCH RBC QN AUTO: 31.1 PG (ref 26.6–33)
MCHC RBC AUTO-ENTMCNC: 34.5 G/DL (ref 31.5–35.7)
MCV RBC AUTO: 90 FL (ref 79–97)
MONOCYTES # BLD AUTO: 0.36 10*3/MM3 (ref 0.1–0.9)
MONOCYTES NFR BLD AUTO: 6 % (ref 5–12)
NEUTROPHILS NFR BLD AUTO: 3.01 10*3/MM3 (ref 1.7–7)
NEUTROPHILS NFR BLD AUTO: 50.3 % (ref 42.7–76)
NRBC BLD AUTO-RTO: 0 /100 WBC (ref 0–0.2)
PLATELET # BLD AUTO: 255 10*3/MM3 (ref 140–450)
PMV BLD AUTO: 8.7 FL (ref 6–12)
RBC # BLD AUTO: 4.41 10*6/MM3 (ref 3.77–5.28)
RH BLD: POSITIVE
T&S EXPIRATION DATE: NORMAL
WBC NRBC COR # BLD: 5.99 10*3/MM3 (ref 3.4–10.8)

## 2023-05-25 PROCEDURE — 86901 BLOOD TYPING SEROLOGIC RH(D): CPT | Performed by: INTERNAL MEDICINE

## 2023-05-25 PROCEDURE — 25010000002 HEPARIN LOCK FLUSH PER 10 UNITS: Performed by: INTERNAL MEDICINE

## 2023-05-25 PROCEDURE — 86850 RBC ANTIBODY SCREEN: CPT | Performed by: INTERNAL MEDICINE

## 2023-05-25 PROCEDURE — 86900 BLOOD TYPING SEROLOGIC ABO: CPT | Performed by: INTERNAL MEDICINE

## 2023-05-25 PROCEDURE — 85025 COMPLETE CBC W/AUTO DIFF WBC: CPT | Performed by: INTERNAL MEDICINE

## 2023-05-25 RX ORDER — ACETAMINOPHEN 650 MG/1
650 SUPPOSITORY RECTAL ONCE
Status: DISCONTINUED | OUTPATIENT
Start: 2023-05-25 | End: 2023-05-27 | Stop reason: HOSPADM

## 2023-05-25 RX ORDER — MONTELUKAST SODIUM 10 MG/1
10 TABLET ORAL NIGHTLY
COMMUNITY

## 2023-05-25 RX ORDER — HEPARIN SODIUM (PORCINE) LOCK FLUSH IV SOLN 100 UNIT/ML 100 UNIT/ML
500 SOLUTION INTRAVENOUS AS NEEDED
OUTPATIENT
Start: 2023-05-25

## 2023-05-25 RX ORDER — HEPARIN SODIUM (PORCINE) LOCK FLUSH IV SOLN 100 UNIT/ML 100 UNIT/ML
500 SOLUTION INTRAVENOUS AS NEEDED
Status: DISCONTINUED | OUTPATIENT
Start: 2023-05-25 | End: 2023-05-27 | Stop reason: HOSPADM

## 2023-05-25 RX ORDER — CLARITHROMYCIN 125 MG/5ML
FOR SUSPENSION ORAL 2 TIMES DAILY
COMMUNITY

## 2023-05-25 RX ORDER — ACETAMINOPHEN 160 MG/5ML
650 SOLUTION ORAL ONCE
Status: DISCONTINUED | OUTPATIENT
Start: 2023-05-25 | End: 2023-05-27 | Stop reason: HOSPADM

## 2023-05-25 RX ORDER — ACETAMINOPHEN 325 MG/1
650 TABLET ORAL ONCE
Status: DISCONTINUED | OUTPATIENT
Start: 2023-05-25 | End: 2023-05-27 | Stop reason: HOSPADM

## 2023-05-25 RX ADMIN — HEPARIN 500 UNITS: 100 SYRINGE at 09:59

## 2023-06-17 ENCOUNTER — HOSPITAL ENCOUNTER (OUTPATIENT)
Dept: INFUSION THERAPY | Facility: HOSPITAL | Age: 43
Discharge: HOME | End: 2023-06-17
Payer: MEDICARE

## 2023-06-17 VITALS
OXYGEN SATURATION: 96 % | SYSTOLIC BLOOD PRESSURE: 117 MMHG | TEMPERATURE: 98.5 F | DIASTOLIC BLOOD PRESSURE: 71 MMHG | RESPIRATION RATE: 14 BRPM | HEART RATE: 66 BPM

## 2023-06-17 DIAGNOSIS — Z95.828 PORT-A-CATH IN PLACE: Primary | ICD-10-CM

## 2023-06-17 LAB
ABO GROUP BLD: NORMAL
BASOPHILS # BLD AUTO: 0.03 10*3/MM3 (ref 0–0.2)
BASOPHILS NFR BLD AUTO: 0.5 % (ref 0–1.5)
DEPRECATED RDW RBC AUTO: 43.2 FL (ref 37–54)
EOSINOPHIL # BLD AUTO: 0.12 10*3/MM3 (ref 0–0.4)
EOSINOPHIL NFR BLD AUTO: 1.9 % (ref 0.3–6.2)
ERYTHROCYTE [DISTWIDTH] IN BLOOD BY AUTOMATED COUNT: 13.2 % (ref 12.3–15.4)
HCT VFR BLD AUTO: 40.9 % (ref 34–46.6)
HGB BLD-MCNC: 14.1 G/DL (ref 12–15.9)
IMM GRANULOCYTES # BLD AUTO: 0 10*3/MM3 (ref 0–0.05)
IMM GRANULOCYTES NFR BLD AUTO: 0 % (ref 0–0.5)
LYMPHOCYTES # BLD AUTO: 2.55 10*3/MM3 (ref 0.7–3.1)
LYMPHOCYTES NFR BLD AUTO: 40.2 % (ref 19.6–45.3)
MCH RBC QN AUTO: 30.9 PG (ref 26.6–33)
MCHC RBC AUTO-ENTMCNC: 34.5 G/DL (ref 31.5–35.7)
MCV RBC AUTO: 89.5 FL (ref 79–97)
MONOCYTES # BLD AUTO: 0.32 10*3/MM3 (ref 0.1–0.9)
MONOCYTES NFR BLD AUTO: 5 % (ref 5–12)
NEUTROPHILS NFR BLD AUTO: 3.32 10*3/MM3 (ref 1.7–7)
NEUTROPHILS NFR BLD AUTO: 52.4 % (ref 42.7–76)
NRBC BLD AUTO-RTO: 0 /100 WBC (ref 0–0.2)
PLATELET # BLD AUTO: 233 10*3/MM3 (ref 140–450)
PMV BLD AUTO: 8.7 FL (ref 6–12)
RBC # BLD AUTO: 4.57 10*6/MM3 (ref 3.77–5.28)
RH BLD: POSITIVE
WBC NRBC COR # BLD: 6.34 10*3/MM3 (ref 3.4–10.8)

## 2023-06-17 PROCEDURE — 85025 COMPLETE CBC W/AUTO DIFF WBC: CPT | Performed by: INTERNAL MEDICINE

## 2023-06-17 PROCEDURE — 86900 BLOOD TYPING SEROLOGIC ABO: CPT | Performed by: INTERNAL MEDICINE

## 2023-06-17 PROCEDURE — 86901 BLOOD TYPING SEROLOGIC RH(D): CPT | Performed by: INTERNAL MEDICINE

## 2023-06-17 PROCEDURE — 25010000002 HEPARIN LOCK FLUSH PER 10 UNITS: Performed by: INTERNAL MEDICINE

## 2023-06-17 RX ORDER — HEPARIN SODIUM (PORCINE) LOCK FLUSH IV SOLN 100 UNIT/ML 100 UNIT/ML
500 SOLUTION INTRAVENOUS AS NEEDED
Status: DISCONTINUED | OUTPATIENT
Start: 2023-06-17 | End: 2023-06-19 | Stop reason: HOSPADM

## 2023-06-17 RX ORDER — HEPARIN SODIUM (PORCINE) LOCK FLUSH IV SOLN 100 UNIT/ML 100 UNIT/ML
500 SOLUTION INTRAVENOUS AS NEEDED
OUTPATIENT
Start: 2023-06-17

## 2023-06-17 RX ORDER — HEPARIN SODIUM (PORCINE) LOCK FLUSH IV SOLN 100 UNIT/ML 100 UNIT/ML
500 SOLUTION INTRAVENOUS AS NEEDED
Status: CANCELLED | OUTPATIENT
Start: 2023-06-17

## 2023-06-17 RX ADMIN — HEPARIN SODIUM (PORCINE) LOCK FLUSH IV SOLN 100 UNIT/ML 500 UNITS: 100 SOLUTION at 13:03

## 2023-06-29 PROCEDURE — 36430 TRANSFUSION BLD/BLD COMPNT: CPT

## 2023-06-29 PROCEDURE — P9035 PLATELET PHERES LEUKOREDUCED: HCPCS

## 2023-07-27 ENCOUNTER — TRANSCRIBE ORDERS (OUTPATIENT)
Dept: ADMINISTRATIVE | Facility: HOSPITAL | Age: 43
End: 2023-07-27
Payer: MEDICARE

## 2023-07-27 DIAGNOSIS — D69.1 QUALITATIVE PLATELET DEFECTS: Primary | ICD-10-CM

## 2023-07-29 ENCOUNTER — HOSPITAL ENCOUNTER (OUTPATIENT)
Dept: INFUSION THERAPY | Facility: HOSPITAL | Age: 43
Discharge: HOME | End: 2023-07-29
Payer: MEDICARE

## 2023-07-29 VITALS
SYSTOLIC BLOOD PRESSURE: 118 MMHG | DIASTOLIC BLOOD PRESSURE: 75 MMHG | RESPIRATION RATE: 16 BRPM | TEMPERATURE: 98.2 F | OXYGEN SATURATION: 100 % | HEART RATE: 59 BPM

## 2023-07-29 LAB
ABO GROUP BLD: NORMAL
BASOPHILS # BLD AUTO: 0.02 10*3/MM3 (ref 0–0.2)
BASOPHILS NFR BLD AUTO: 0.4 % (ref 0–1.5)
DEPRECATED RDW RBC AUTO: 40 FL (ref 37–54)
EOSINOPHIL # BLD AUTO: 0.08 10*3/MM3 (ref 0–0.4)
EOSINOPHIL NFR BLD AUTO: 1.4 % (ref 0.3–6.2)
ERYTHROCYTE [DISTWIDTH] IN BLOOD BY AUTOMATED COUNT: 12.3 % (ref 12.3–15.4)
HCT VFR BLD AUTO: 37.6 % (ref 34–46.6)
HGB BLD-MCNC: 13.2 G/DL (ref 12–15.9)
IMM GRANULOCYTES # BLD AUTO: 0.01 10*3/MM3 (ref 0–0.05)
IMM GRANULOCYTES NFR BLD AUTO: 0.2 % (ref 0–0.5)
LYMPHOCYTES # BLD AUTO: 2.3 10*3/MM3 (ref 0.7–3.1)
LYMPHOCYTES NFR BLD AUTO: 41.1 % (ref 19.6–45.3)
MCH RBC QN AUTO: 31.3 PG (ref 26.6–33)
MCHC RBC AUTO-ENTMCNC: 35.1 G/DL (ref 31.5–35.7)
MCV RBC AUTO: 89.1 FL (ref 79–97)
MONOCYTES # BLD AUTO: 0.33 10*3/MM3 (ref 0.1–0.9)
MONOCYTES NFR BLD AUTO: 5.9 % (ref 5–12)
NEUTROPHILS NFR BLD AUTO: 2.85 10*3/MM3 (ref 1.7–7)
NEUTROPHILS NFR BLD AUTO: 51 % (ref 42.7–76)
NRBC BLD AUTO-RTO: 0 /100 WBC (ref 0–0.2)
PLATELET # BLD AUTO: 209 10*3/MM3 (ref 140–450)
PMV BLD AUTO: 9 FL (ref 6–12)
RBC # BLD AUTO: 4.22 10*6/MM3 (ref 3.77–5.28)
RH BLD: POSITIVE
WBC NRBC COR # BLD: 5.59 10*3/MM3 (ref 3.4–10.8)

## 2023-07-29 PROCEDURE — 25010000002 HEPARIN LOCK FLUSH PER 10 UNITS: Performed by: INTERNAL MEDICINE

## 2023-07-29 PROCEDURE — 36430 TRANSFUSION BLD/BLD COMPNT: CPT

## 2023-07-29 PROCEDURE — P9037 PLATE PHERES LEUKOREDU IRRAD: HCPCS

## 2023-07-29 PROCEDURE — 86901 BLOOD TYPING SEROLOGIC RH(D): CPT | Performed by: INTERNAL MEDICINE

## 2023-07-29 PROCEDURE — 86900 BLOOD TYPING SEROLOGIC ABO: CPT | Performed by: INTERNAL MEDICINE

## 2023-07-29 PROCEDURE — 85025 COMPLETE CBC W/AUTO DIFF WBC: CPT | Performed by: INTERNAL MEDICINE

## 2023-07-29 PROCEDURE — P9100 PATHOGEN TEST FOR PLATELETS: HCPCS

## 2023-07-29 RX ORDER — SODIUM CHLORIDE 0.9 % (FLUSH) 0.9 %
10 SYRINGE (ML) INJECTION AS NEEDED
Status: DISCONTINUED | OUTPATIENT
Start: 2023-07-29 | End: 2023-07-31 | Stop reason: HOSPADM

## 2023-07-29 RX ORDER — HEPARIN SODIUM (PORCINE) LOCK FLUSH IV SOLN 100 UNIT/ML 100 UNIT/ML
5 SOLUTION INTRAVENOUS AS NEEDED
Status: DISCONTINUED | OUTPATIENT
Start: 2023-07-29 | End: 2023-07-31 | Stop reason: HOSPADM

## 2023-07-29 RX ORDER — SODIUM CHLORIDE 9 MG/ML
40 INJECTION, SOLUTION INTRAVENOUS AS NEEDED
Status: DISCONTINUED | OUTPATIENT
Start: 2023-07-29 | End: 2023-07-31 | Stop reason: HOSPADM

## 2023-07-29 RX ORDER — SODIUM CHLORIDE 0.9 % (FLUSH) 0.9 %
10 SYRINGE (ML) INJECTION EVERY 12 HOURS SCHEDULED
Status: DISCONTINUED | OUTPATIENT
Start: 2023-07-29 | End: 2023-07-31 | Stop reason: HOSPADM

## 2023-07-29 RX ORDER — SODIUM CHLORIDE 0.9 % (FLUSH) 0.9 %
20 SYRINGE (ML) INJECTION AS NEEDED
Status: DISCONTINUED | OUTPATIENT
Start: 2023-07-29 | End: 2023-07-31 | Stop reason: HOSPADM

## 2023-07-29 RX ADMIN — Medication 10 ML: at 13:26

## 2023-07-29 RX ADMIN — HEPARIN 500 UNITS: 100 SYRINGE at 13:26

## 2023-07-29 NOTE — NURSING NOTE
Patient presented for platelet transfusion.  Port accessed per standing order.  Patient received one unit of platelets.  Tolerated well.  Port saline and heparin locked.  Port deaccessed.  Patient discharged home with self-care.

## 2023-08-25 ENCOUNTER — PREP FOR SURGERY (OUTPATIENT)
Dept: OTHER | Facility: HOSPITAL | Age: 43
End: 2023-08-25
Payer: MEDICARE

## 2023-08-25 RX ORDER — ACETAMINOPHEN 325 MG/1
650 TABLET ORAL ONCE
OUTPATIENT
Start: 2023-08-25 | End: 2023-08-25

## 2023-08-25 RX ORDER — ACETAMINOPHEN 650 MG/1
650 SUPPOSITORY RECTAL ONCE
OUTPATIENT
Start: 2023-08-25

## 2023-08-25 RX ORDER — ACETAMINOPHEN 160 MG/5ML
650 SOLUTION ORAL ONCE
OUTPATIENT
Start: 2023-08-25

## 2023-08-28 ENCOUNTER — TRANSCRIBE ORDERS (OUTPATIENT)
Dept: ADMINISTRATIVE | Facility: HOSPITAL | Age: 43
End: 2023-08-28
Payer: MEDICARE

## 2023-08-28 DIAGNOSIS — R07.89 OTHER CHEST PAIN: Primary | ICD-10-CM

## 2023-08-28 DIAGNOSIS — D69.1 QUALITATIVE PLATELET DEFECTS: Primary | ICD-10-CM

## 2023-08-28 DIAGNOSIS — Z95.828 ACQUIRED PORTAL-SYSTEMIC SHUNT: ICD-10-CM

## 2023-08-31 ENCOUNTER — HOSPITAL ENCOUNTER (OUTPATIENT)
Dept: INFUSION THERAPY | Facility: HOSPITAL | Age: 43
Discharge: HOME | End: 2023-08-31
Payer: MEDICARE

## 2023-08-31 VITALS
HEART RATE: 72 BPM | WEIGHT: 186.07 LBS | RESPIRATION RATE: 16 BRPM | DIASTOLIC BLOOD PRESSURE: 51 MMHG | SYSTOLIC BLOOD PRESSURE: 116 MMHG | BODY MASS INDEX: 31 KG/M2 | OXYGEN SATURATION: 96 % | TEMPERATURE: 98.6 F | HEIGHT: 65 IN

## 2023-08-31 DIAGNOSIS — D69.1 QUALITATIVE PLATELET DISORDER: Primary | ICD-10-CM

## 2023-08-31 DIAGNOSIS — Z95.828 PORT-A-CATH IN PLACE: ICD-10-CM

## 2023-08-31 LAB
ABO GROUP BLD: NORMAL
BLD GP AB SCN SERPL QL: NEGATIVE
PLATELET # BLD AUTO: 239 10*3/MM3 (ref 140–450)
RH BLD: POSITIVE
T&S EXPIRATION DATE: NORMAL

## 2023-08-31 PROCEDURE — 86901 BLOOD TYPING SEROLOGIC RH(D): CPT | Performed by: INTERNAL MEDICINE

## 2023-08-31 PROCEDURE — 25010000002 HEPARIN LOCK FLUSH PER 10 UNITS: Performed by: INTERNAL MEDICINE

## 2023-08-31 PROCEDURE — 86850 RBC ANTIBODY SCREEN: CPT | Performed by: INTERNAL MEDICINE

## 2023-08-31 PROCEDURE — 86900 BLOOD TYPING SEROLOGIC ABO: CPT | Performed by: INTERNAL MEDICINE

## 2023-08-31 PROCEDURE — 85049 AUTOMATED PLATELET COUNT: CPT | Performed by: INTERNAL MEDICINE

## 2023-08-31 RX ORDER — HEPARIN SODIUM (PORCINE) LOCK FLUSH IV SOLN 100 UNIT/ML 100 UNIT/ML
500 SOLUTION INTRAVENOUS AS NEEDED
OUTPATIENT
Start: 2023-08-31

## 2023-08-31 RX ORDER — HEPARIN SODIUM (PORCINE) LOCK FLUSH IV SOLN 100 UNIT/ML 100 UNIT/ML
500 SOLUTION INTRAVENOUS AS NEEDED
Status: DISCONTINUED | OUTPATIENT
Start: 2023-08-31 | End: 2023-09-02 | Stop reason: HOSPADM

## 2023-08-31 RX ADMIN — HEPARIN 500 UNITS: 100 SYRINGE at 08:38

## 2023-09-07 ENCOUNTER — HOSPITAL ENCOUNTER (OUTPATIENT)
Dept: INTERVENTIONAL RADIOLOGY/VASCULAR | Facility: HOSPITAL | Age: 43
Discharge: HOME | End: 2023-09-07
Payer: MEDICARE

## 2023-10-03 DIAGNOSIS — D69.1: Primary | ICD-10-CM

## 2023-10-04 ENCOUNTER — HOSPITAL ENCOUNTER (OUTPATIENT)
Dept: INFUSION THERAPY | Facility: HOSPITAL | Age: 43
Discharge: HOME | End: 2023-10-04
Attending: INTERNAL MEDICINE | Admitting: INTERNAL MEDICINE
Payer: MEDICARE

## 2023-10-04 VITALS
SYSTOLIC BLOOD PRESSURE: 107 MMHG | OXYGEN SATURATION: 100 % | RESPIRATION RATE: 18 BRPM | TEMPERATURE: 97.9 F | HEART RATE: 60 BPM | DIASTOLIC BLOOD PRESSURE: 65 MMHG

## 2023-10-04 DIAGNOSIS — D69.1: ICD-10-CM

## 2023-10-04 LAB
ABO GROUP BLD: NORMAL
BLD GP AB SCN SERPL QL: NEGATIVE
HCT VFR BLD AUTO: 36.6 % (ref 34–46.6)
HGB BLD-MCNC: 12.3 G/DL (ref 12–15.9)
RH BLD: POSITIVE
T&S EXPIRATION DATE: NORMAL

## 2023-10-04 PROCEDURE — 85018 HEMOGLOBIN: CPT | Performed by: INTERNAL MEDICINE

## 2023-10-04 PROCEDURE — 25010000002 HEPARIN LOCK FLUSH PER 10 UNITS: Performed by: INTERNAL MEDICINE

## 2023-10-04 PROCEDURE — 86850 RBC ANTIBODY SCREEN: CPT | Performed by: INTERNAL MEDICINE

## 2023-10-04 PROCEDURE — 85014 HEMATOCRIT: CPT | Performed by: INTERNAL MEDICINE

## 2023-10-04 PROCEDURE — 86901 BLOOD TYPING SEROLOGIC RH(D): CPT | Performed by: INTERNAL MEDICINE

## 2023-10-04 PROCEDURE — 86900 BLOOD TYPING SEROLOGIC ABO: CPT | Performed by: INTERNAL MEDICINE

## 2023-10-04 RX ORDER — HEPARIN SODIUM (PORCINE) LOCK FLUSH IV SOLN 100 UNIT/ML 100 UNIT/ML
5 SOLUTION INTRAVENOUS AS NEEDED
Status: DISCONTINUED | OUTPATIENT
Start: 2023-10-04 | End: 2023-10-04 | Stop reason: HOSPADM

## 2023-10-04 RX ORDER — ACETAMINOPHEN 160 MG/5ML
650 SOLUTION ORAL ONCE
Status: DISCONTINUED | OUTPATIENT
Start: 2023-10-04 | End: 2023-10-04 | Stop reason: HOSPADM

## 2023-10-04 RX ORDER — ACETAMINOPHEN 325 MG/1
650 TABLET ORAL ONCE
Status: DISCONTINUED | OUTPATIENT
Start: 2023-10-04 | End: 2023-10-04 | Stop reason: HOSPADM

## 2023-10-04 RX ORDER — ACETAMINOPHEN 650 MG/1
650 SUPPOSITORY RECTAL ONCE
Status: DISCONTINUED | OUTPATIENT
Start: 2023-10-04 | End: 2023-10-04 | Stop reason: HOSPADM

## 2023-10-04 RX ADMIN — HEPARIN 500 UNITS: 100 SYRINGE at 19:58

## 2023-10-05 NOTE — NURSING NOTE
2000, 10/4/23- Pt VSS. Patient completed platelets. Port deaccsessed per protocol. Patient tolerated well.

## 2023-10-12 ENCOUNTER — TRANSCRIBE ORDERS (OUTPATIENT)
Dept: ADMINISTRATIVE | Facility: HOSPITAL | Age: 43
End: 2023-10-12
Payer: MEDICARE

## 2023-10-12 DIAGNOSIS — M54.50 ACUTE RIGHT-SIDED LOW BACK PAIN WITHOUT SCIATICA: Primary | ICD-10-CM

## 2023-10-30 ENCOUNTER — HOSPITAL ENCOUNTER (OUTPATIENT)
Dept: GENERAL RADIOLOGY | Facility: HOSPITAL | Age: 43
Discharge: HOME | End: 2023-10-30
Admitting: INTERNAL MEDICINE
Payer: MEDICARE

## 2023-10-30 DIAGNOSIS — M54.50 ACUTE RIGHT-SIDED LOW BACK PAIN WITHOUT SCIATICA: ICD-10-CM

## 2023-10-30 PROCEDURE — 72110 X-RAY EXAM L-2 SPINE 4/>VWS: CPT

## 2023-11-10 ENCOUNTER — TRANSCRIBE ORDERS (OUTPATIENT)
Dept: ONCOLOGY | Facility: HOSPITAL | Age: 43
End: 2023-11-10
Payer: MEDICARE

## 2023-11-10 DIAGNOSIS — D69.1 DYSFUNCTIONAL PLATELETS: Primary | ICD-10-CM

## 2023-11-20 ENCOUNTER — TRANSCRIBE ORDERS (OUTPATIENT)
Dept: ADMINISTRATIVE | Facility: HOSPITAL | Age: 43
End: 2023-11-20
Payer: MEDICARE

## 2023-11-20 ENCOUNTER — HOSPITAL ENCOUNTER (OUTPATIENT)
Dept: GENERAL RADIOLOGY | Facility: HOSPITAL | Age: 43
Discharge: HOME | End: 2023-11-20
Admitting: ANESTHESIOLOGY
Payer: MEDICARE

## 2023-11-20 DIAGNOSIS — M46.1 SACROILIITIS, NOT ELSEWHERE CLASSIFIED: Primary | ICD-10-CM

## 2023-11-20 DIAGNOSIS — M46.1 SACROILIITIS, NOT ELSEWHERE CLASSIFIED: ICD-10-CM

## 2023-11-20 PROCEDURE — 72220 X-RAY EXAM SACRUM TAILBONE: CPT

## 2023-11-22 ENCOUNTER — TRANSCRIBE ORDERS (OUTPATIENT)
Dept: ADMINISTRATIVE | Facility: HOSPITAL | Age: 43
End: 2023-11-22
Payer: MEDICARE

## 2023-11-22 ENCOUNTER — TELEPHONE (OUTPATIENT)
Dept: ONCOLOGY | Facility: HOSPITAL | Age: 43
End: 2023-11-22
Payer: MEDICARE

## 2023-11-22 NOTE — TELEPHONE ENCOUNTER
Patient states she is former Dr. Mehta patient and is scheduled to see Dr. Grimm in December. Patient states her PCP is trying to contact our office to get copies of her records so they can order her port flush and platelet transfusions prior to her first visit with Dr. Grimm. We will try to contact Barby Santizo to see what information they need.

## 2023-11-27 ENCOUNTER — TRANSCRIBE ORDERS (OUTPATIENT)
Dept: ADMINISTRATIVE | Facility: HOSPITAL | Age: 43
End: 2023-11-27
Payer: MEDICARE

## 2023-11-27 ENCOUNTER — LAB (OUTPATIENT)
Dept: LAB | Facility: HOSPITAL | Age: 43
End: 2023-11-27
Payer: MEDICARE

## 2023-11-27 DIAGNOSIS — D69.1 PLATELET STORAGE POOL DISEASE: ICD-10-CM

## 2023-11-27 DIAGNOSIS — D69.1 PLATELET STORAGE POOL DISEASE: Primary | ICD-10-CM

## 2023-11-27 LAB
ABO GROUP BLD: NORMAL
ALBUMIN SERPL-MCNC: 4.1 G/DL (ref 3.5–5.2)
ALBUMIN/GLOB SERPL: 1.5 G/DL
ALP SERPL-CCNC: 60 U/L (ref 39–117)
ALT SERPL W P-5'-P-CCNC: 15 U/L (ref 1–33)
ANION GAP SERPL CALCULATED.3IONS-SCNC: 5.9 MMOL/L (ref 5–15)
AST SERPL-CCNC: 16 U/L (ref 1–32)
BASOPHILS # BLD AUTO: 0.03 10*3/MM3 (ref 0–0.2)
BASOPHILS NFR BLD AUTO: 0.6 % (ref 0–1.5)
BILIRUB SERPL-MCNC: 0.3 MG/DL (ref 0–1.2)
BLD GP AB SCN SERPL QL: NEGATIVE
BUN SERPL-MCNC: 10 MG/DL (ref 6–20)
BUN/CREAT SERPL: 14.1 (ref 7–25)
CALCIUM SPEC-SCNC: 9.4 MG/DL (ref 8.6–10.5)
CHLORIDE SERPL-SCNC: 106 MMOL/L (ref 98–107)
CO2 SERPL-SCNC: 27.1 MMOL/L (ref 22–29)
CREAT SERPL-MCNC: 0.71 MG/DL (ref 0.57–1)
DEPRECATED RDW RBC AUTO: 42.6 FL (ref 37–54)
EGFRCR SERPLBLD CKD-EPI 2021: 109 ML/MIN/1.73
EOSINOPHIL # BLD AUTO: 0.06 10*3/MM3 (ref 0–0.4)
EOSINOPHIL NFR BLD AUTO: 1.1 % (ref 0.3–6.2)
ERYTHROCYTE [DISTWIDTH] IN BLOOD BY AUTOMATED COUNT: 12.4 % (ref 12.3–15.4)
GLOBULIN UR ELPH-MCNC: 2.8 GM/DL
GLUCOSE SERPL-MCNC: 70 MG/DL (ref 65–99)
HCT VFR BLD AUTO: 44.3 % (ref 34–46.6)
HGB BLD-MCNC: 15.1 G/DL (ref 12–15.9)
IMM GRANULOCYTES # BLD AUTO: 0.01 10*3/MM3 (ref 0–0.05)
IMM GRANULOCYTES NFR BLD AUTO: 0.2 % (ref 0–0.5)
LYMPHOCYTES # BLD AUTO: 1.58 10*3/MM3 (ref 0.7–3.1)
LYMPHOCYTES NFR BLD AUTO: 30 % (ref 19.6–45.3)
MCH RBC QN AUTO: 31.5 PG (ref 26.6–33)
MCHC RBC AUTO-ENTMCNC: 34.1 G/DL (ref 31.5–35.7)
MCV RBC AUTO: 92.5 FL (ref 79–97)
MONOCYTES # BLD AUTO: 0.23 10*3/MM3 (ref 0.1–0.9)
MONOCYTES NFR BLD AUTO: 4.4 % (ref 5–12)
NEUTROPHILS NFR BLD AUTO: 3.35 10*3/MM3 (ref 1.7–7)
NEUTROPHILS NFR BLD AUTO: 63.7 % (ref 42.7–76)
NRBC BLD AUTO-RTO: 0 /100 WBC (ref 0–0.2)
PLATELET # BLD AUTO: 238 10*3/MM3 (ref 140–450)
PMV BLD AUTO: 9 FL (ref 6–12)
POTASSIUM SERPL-SCNC: 4.5 MMOL/L (ref 3.5–5.2)
PROT SERPL-MCNC: 6.9 G/DL (ref 6–8.5)
RBC # BLD AUTO: 4.79 10*6/MM3 (ref 3.77–5.28)
RH BLD: POSITIVE
SODIUM SERPL-SCNC: 139 MMOL/L (ref 136–145)
T&S EXPIRATION DATE: NORMAL
WBC NRBC COR # BLD AUTO: 5.26 10*3/MM3 (ref 3.4–10.8)

## 2023-11-27 PROCEDURE — 85025 COMPLETE CBC W/AUTO DIFF WBC: CPT

## 2023-11-27 PROCEDURE — 86850 RBC ANTIBODY SCREEN: CPT

## 2023-11-27 PROCEDURE — 86900 BLOOD TYPING SEROLOGIC ABO: CPT

## 2023-11-27 PROCEDURE — 80053 COMPREHEN METABOLIC PANEL: CPT

## 2023-11-27 PROCEDURE — 36415 COLL VENOUS BLD VENIPUNCTURE: CPT

## 2023-11-27 PROCEDURE — 86901 BLOOD TYPING SEROLOGIC RH(D): CPT

## 2023-11-28 ENCOUNTER — HOSPITAL ENCOUNTER (OUTPATIENT)
Dept: INFUSION THERAPY | Facility: HOSPITAL | Age: 43
Discharge: HOME | End: 2023-11-28
Attending: INTERNAL MEDICINE | Admitting: INTERNAL MEDICINE
Payer: MEDICARE

## 2023-11-28 VITALS
OXYGEN SATURATION: 100 % | RESPIRATION RATE: 16 BRPM | SYSTOLIC BLOOD PRESSURE: 111 MMHG | HEART RATE: 84 BPM | TEMPERATURE: 98 F | DIASTOLIC BLOOD PRESSURE: 63 MMHG

## 2023-11-28 DIAGNOSIS — Z95.828 PORT-A-CATH IN PLACE: Primary | ICD-10-CM

## 2023-11-28 PROCEDURE — 25010000002 HEPARIN LOCK FLUSH PER 10 UNITS: Performed by: INTERNAL MEDICINE

## 2023-11-28 PROCEDURE — 36430 TRANSFUSION BLD/BLD COMPNT: CPT

## 2023-11-28 PROCEDURE — P9100 PATHOGEN TEST FOR PLATELETS: HCPCS

## 2023-11-28 PROCEDURE — P9035 PLATELET PHERES LEUKOREDUCED: HCPCS

## 2023-11-28 PROCEDURE — P9037 PLATE PHERES LEUKOREDU IRRAD: HCPCS

## 2023-11-28 RX ORDER — HEPARIN SODIUM (PORCINE) LOCK FLUSH IV SOLN 100 UNIT/ML 100 UNIT/ML
500 SOLUTION INTRAVENOUS AS NEEDED
OUTPATIENT
Start: 2023-11-28

## 2023-11-28 RX ORDER — HEPARIN SODIUM (PORCINE) LOCK FLUSH IV SOLN 100 UNIT/ML 100 UNIT/ML
500 SOLUTION INTRAVENOUS AS NEEDED
Status: DISCONTINUED | OUTPATIENT
Start: 2023-11-28 | End: 2023-11-28 | Stop reason: HOSPADM

## 2023-11-28 RX ADMIN — HEPARIN 500 UNITS: 100 SYRINGE at 08:51

## 2023-12-08 RX ORDER — PHENTERMINE HYDROCHLORIDE 37.5 MG/1
1 CAPSULE ORAL DAILY
COMMUNITY

## 2023-12-08 RX ORDER — FLUTICASONE PROPIONATE 50 MCG
2 SPRAY, SUSPENSION (ML) NASAL DAILY
COMMUNITY

## 2023-12-08 RX ORDER — CLOTRIMAZOLE AND BETAMETHASONE DIPROPIONATE 10; .64 MG/G; MG/G
CREAM TOPICAL
COMMUNITY
Start: 2023-11-13

## 2023-12-11 ENCOUNTER — CONSULT (OUTPATIENT)
Dept: ONCOLOGY | Facility: HOSPITAL | Age: 43
End: 2023-12-11
Payer: MEDICARE

## 2023-12-11 VITALS
DIASTOLIC BLOOD PRESSURE: 74 MMHG | RESPIRATION RATE: 14 BRPM | OXYGEN SATURATION: 98 % | HEART RATE: 80 BPM | WEIGHT: 191.36 LBS | SYSTOLIC BLOOD PRESSURE: 129 MMHG | BODY MASS INDEX: 31.84 KG/M2 | TEMPERATURE: 97.6 F

## 2023-12-11 DIAGNOSIS — D69.1 QUALITATIVE PLATELET DISORDER: Primary | ICD-10-CM

## 2023-12-11 PROCEDURE — 1160F RVW MEDS BY RX/DR IN RCRD: CPT | Performed by: INTERNAL MEDICINE

## 2023-12-11 PROCEDURE — 99204 OFFICE O/P NEW MOD 45 MIN: CPT | Performed by: INTERNAL MEDICINE

## 2023-12-11 PROCEDURE — 1126F AMNT PAIN NOTED NONE PRSNT: CPT | Performed by: INTERNAL MEDICINE

## 2023-12-11 PROCEDURE — G0463 HOSPITAL OUTPT CLINIC VISIT: HCPCS | Performed by: INTERNAL MEDICINE

## 2023-12-11 PROCEDURE — 1159F MED LIST DOCD IN RCRD: CPT | Performed by: INTERNAL MEDICINE

## 2023-12-11 RX ORDER — PREDNISONE 20 MG/1
1 TABLET ORAL EVERY 12 HOURS SCHEDULED
COMMUNITY
Start: 2023-12-05

## 2023-12-11 RX ORDER — BENZONATATE 200 MG/1
200 CAPSULE ORAL 3 TIMES DAILY PRN
COMMUNITY
Start: 2023-12-05

## 2023-12-11 RX ORDER — AMOXICILLIN AND CLAVULANATE POTASSIUM 875; 125 MG/1; MG/1
1 TABLET, FILM COATED ORAL EVERY 12 HOURS SCHEDULED
COMMUNITY
Start: 2023-12-05

## 2023-12-11 NOTE — PROGRESS NOTES
Chief Complaint  Coagulation Disorder      Barby Santizo APRN    Subjective          Suzy Rox Whitaker presents to [facility] for establishment of care.  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  This was diagnosed in [place].  She reports chronic nosebleeds which she still has on occasion.  She is being treated with platelet transfusions as needed.  She reports needing a platelet transfusion around every 6 to 8 weeks.  She denies other obvious bleeding.  She has a Port-A-Cath.  She denies any issues from that.    Oncology/Hematology History    No history exists.       Review of Systems   Constitutional:  Positive for fatigue. Negative for appetite change, diaphoresis, fever, unexpected weight gain and unexpected weight loss.   HENT:  Negative for hearing loss, sore throat and voice change.    Eyes:  Negative for blurred vision, double vision, pain, redness and visual disturbance.   Respiratory:  Negative for cough, shortness of breath and wheezing.    Cardiovascular:  Negative for chest pain, palpitations and leg swelling.   Endocrine: Negative for cold intolerance, heat intolerance, polydipsia and polyuria.   Genitourinary:  Negative for decreased urine volume, difficulty urinating, frequency and urinary incontinence.   Musculoskeletal:  Negative for arthralgias, back pain, joint swelling and myalgias.   Skin:  Negative for color change, rash, skin lesions and wound.   Neurological:  Negative for dizziness, seizures, numbness and headache.   Hematological:  Negative for adenopathy. Does not bruise/bleed easily.   Psychiatric/Behavioral:  Negative for depressed mood. The patient is not nervous/anxious.      Current Outpatient Medications on File Prior to Visit   Medication Sig Dispense Refill    amoxicillin-clavulanate (AUGMENTIN) 875-125 MG per tablet Take 1 tablet by mouth Every 12 (Twelve) Hours.      ARIPiprazole (ABILIFY) 5 MG tablet Take 1 tablet by  mouth Daily.      benzonatate (TESSALON) 200 MG capsule Take 1 capsule by mouth 3 (Three) Times a Day As Needed for Cough.      butalbital-acetaminophen-caffeine (FIORICET, ESGIC) -40 MG per tablet Take 1 tablet by mouth 4 (Four) Times a Day As Needed.      Cariprazine HCl (Vraylar) 1.5 MG capsule capsule       Cetirizine HCl 10 MG capsule Take 10 mg by mouth Daily.      clotrimazole-betamethasone (LOTRISONE) 1-0.05 % cream APPLY TO THE AFFECTED AREA TOPICALLY TWICE DAILY      diclofenac (VOLTAREN) 75 MG EC tablet Take 1 tablet by mouth 2 (Two) Times a Day As Needed.      fluticasone (FLONASE) 50 MCG/ACT nasal spray 2 sprays by Each Nare route Daily.      ibuprofen (ADVIL,MOTRIN) 800 MG tablet Take 1 tablet by mouth 4 (Four) Times a Day As Needed.      levothyroxine (SYNTHROID, LEVOTHROID) 25 MCG tablet Take 1 tablet by mouth Daily.      montelukast (SINGULAIR) 10 MG tablet Take 1 tablet by mouth Every Night.      phentermine 37.5 MG capsule Take 1 capsule by mouth Daily.      predniSONE (DELTASONE) 20 MG tablet Take 1 tablet by mouth Every 12 (Twelve) Hours.      venlafaxine XR (EFFEXOR-XR) 75 MG 24 hr capsule Take 1 capsule by mouth Daily.       No current facility-administered medications on file prior to visit.       Allergies   Allergen Reactions    Adhesive Tape Rash    Morphine Other (See Comments)     CAUSES CHEST PAIN     Past Medical History:   Diagnosis Date    Anxiety     Bleeding disorder     Blood disease     Depression     Disease of thyroid gland     Migraine     Mood disorder     Ovarian cyst     Urinary tract infection      Past Surgical History:   Procedure Laterality Date    COLONOSCOPY      GALLBLADDER SURGERY      HYSTERECTOMY      PORTACATH PLACEMENT      WISDOM TOOTH EXTRACTION       Social History     Socioeconomic History    Marital status:    Tobacco Use    Smoking status: Never     Passive exposure: Past    Smokeless tobacco: Never    Tobacco comments:     01/01/1990    Vaping Use    Vaping Use: Never used   Substance and Sexual Activity    Alcohol use: Not Currently    Drug use: Yes     Types: Marijuana    Sexual activity: Yes     Partners: Male     Birth control/protection: Surgical     Family History   Problem Relation Age of Onset    Diabetes Mother     Other Mother         blood diseases    Stroke Father     Heart disease Father     Cancer Father     Diabetes Sister     Kidney cancer Maternal Grandmother     Breast cancer Neg Hx     Colon cancer Neg Hx     Ovarian cancer Neg Hx     Uterine cancer Neg Hx     Prostate cancer Neg Hx        Objective   Physical Exam  Vitals reviewed. Exam conducted with a chaperone present.   Constitutional:       General: She is not in acute distress.     Appearance: Normal appearance.   Cardiovascular:      Rate and Rhythm: Normal rate and regular rhythm.      Heart sounds: Normal heart sounds. No murmur heard.     No gallop.   Pulmonary:      Effort: Pulmonary effort is normal.      Breath sounds: Normal breath sounds.      Comments: Port-A-Cath  Abdominal:      General: Abdomen is flat. Bowel sounds are normal.      Palpations: Abdomen is soft.   Musculoskeletal:      Right lower leg: No edema.      Left lower leg: No edema.   Neurological:      Mental Status: She is alert and oriented to person, place, and time.   Psychiatric:         Mood and Affect: Mood normal.         Behavior: Behavior normal.         Vitals:    12/11/23 1053   BP: 129/74   Pulse: 80   Resp: 14   Temp: 97.6 °F (36.4 °C)   TempSrc: Temporal   SpO2: 98%   Weight: 86.8 kg (191 lb 5.8 oz)   PainSc: 0-No pain     ECOG score: 0         PHQ-9 Total Score:                    Result Review :   The following data was reviewed by: Torrey Grimm MD on 12/11/2023:  Lab Results   Component Value Date    HGB 15.1 11/27/2023    HCT 44.3 11/27/2023    MCV 92.5 11/27/2023     11/27/2023    WBC 5.26 11/27/2023    NEUTROABS 3.35 11/27/2023    LYMPHSABS 1.58 11/27/2023     "MONOSABS 0.23 11/27/2023    EOSABS 0.06 11/27/2023    BASOSABS 0.03 11/27/2023     Lab Results   Component Value Date    GLUCOSE 70 11/27/2023    BUN 10 11/27/2023    CREATININE 0.71 11/27/2023     11/27/2023    K 4.5 11/27/2023     11/27/2023    CO2 27.1 11/27/2023    CALCIUM 9.4 11/27/2023    PROTEINTOT 6.9 11/27/2023    ALBUMIN 4.1 11/27/2023    BILITOT 0.3 11/27/2023    ALKPHOS 60 11/27/2023    AST 16 11/27/2023    ALT 15 11/27/2023     Lab Results   Component Value Date    FREET4 1.1 05/15/2019    TSH 1.710 05/15/2019     No results found for: \"IRON\", \"LABIRON\", \"TRANSFERRIN\", \"TIBC\"  No results found for: \"LDH\", \"FERRITIN\", \"WDWECLWH52\", \"FOLATE\"  No results found for: \"PSA\", \"CEA\", \"AFP\", \"\", \"\"          Assessment and Plan    Diagnoses and all orders for this visit:    1. Qualitative platelet disorder (Primary)  Assessment & Plan:  Dense granule deficiency per patient records (she will bring in her original diagnosis from [place]).  Her main symptoms are epistaxis.  She requires platelet replacement/transfusion for bleeding.  She reports requiring treatment with platelets around every 6-8 weeks.  She has a Port-A-Cath with no issues.  I do note that she has nonsteroidal anti-inflammatories listed on her medication list.  Aspirin and nonsteroidal anti-inflammatories impair platelet function and I recommended against their use.  She will discuss with her PCP for alternate treatments.  I would recommend evaluation at a [facility].  Patient is agreeable and will be referred to [facility] for other recommendations.  She will contact us if needed for platelet transfusions and I will plan to see her back in 6 months for checkup.    Orders:  -     Ambulatory Referral to Hematology            Patient Follow Up: 6 months    Patient was given instructions and counseling regarding her condition or for health maintenance advice. Please see specific information " pulled into the AVS if appropriate.     Torrey Grimm MD    12/11/2023

## 2023-12-11 NOTE — ASSESSMENT & PLAN NOTE
Dense granule deficiency per patient records (she will bring in her original diagnosis from [place]).  Her main symptoms are epistaxis.  She requires platelet replacement/transfusion for bleeding.  She reports requiring treatment with platelets around every 6-8 weeks.  She has a Port-A-Cath with no issues.  I do note that she has nonsteroidal anti-inflammatories listed on her medication list.  Aspirin and nonsteroidal anti-inflammatories impair platelet function and I recommended against their use.  She will discuss with her PCP for alternate treatments.  I would recommend evaluation at a [facility] coagulation clinic.  Patient is agreeable and will be referred to [facility] for other recommendations.  She will contact us if needed for platelet transfusions and I will plan to see her back in 6 months for checkup.

## 2024-01-11 PROBLEM — Z45.2 ENCOUNTER FOR ADJUSTMENT OR MANAGEMENT OF VASCULAR ACCESS DEVICE: Status: ACTIVE | Noted: 2024-01-11

## 2024-01-15 ENCOUNTER — HOSPITAL ENCOUNTER (OUTPATIENT)
Dept: ONCOLOGY | Facility: HOSPITAL | Age: 44
Discharge: HOME | End: 2024-01-15
Admitting: INTERNAL MEDICINE
Payer: MEDICARE

## 2024-01-15 DIAGNOSIS — Z95.828 PORT-A-CATH IN PLACE: ICD-10-CM

## 2024-01-15 DIAGNOSIS — Z45.2 ENCOUNTER FOR ADJUSTMENT OR MANAGEMENT OF VASCULAR ACCESS DEVICE: Primary | ICD-10-CM

## 2024-01-15 PROCEDURE — 25010000002 HEPARIN LOCK FLUSH PER 10 UNITS: Performed by: INTERNAL MEDICINE

## 2024-01-15 PROCEDURE — 96523 IRRIG DRUG DELIVERY DEVICE: CPT

## 2024-01-15 RX ORDER — HEPARIN SODIUM (PORCINE) LOCK FLUSH IV SOLN 100 UNIT/ML 100 UNIT/ML
500 SOLUTION INTRAVENOUS AS NEEDED
Status: DISCONTINUED | OUTPATIENT
Start: 2024-01-15 | End: 2024-01-16 | Stop reason: HOSPADM

## 2024-01-15 RX ORDER — SODIUM CHLORIDE 0.9 % (FLUSH) 0.9 %
20 SYRINGE (ML) INJECTION AS NEEDED
Status: DISCONTINUED | OUTPATIENT
Start: 2024-01-15 | End: 2024-01-16 | Stop reason: HOSPADM

## 2024-01-15 RX ORDER — SODIUM CHLORIDE 0.9 % (FLUSH) 0.9 %
20 SYRINGE (ML) INJECTION AS NEEDED
OUTPATIENT
Start: 2024-01-15

## 2024-01-15 RX ORDER — HEPARIN SODIUM (PORCINE) LOCK FLUSH IV SOLN 100 UNIT/ML 100 UNIT/ML
500 SOLUTION INTRAVENOUS AS NEEDED
OUTPATIENT
Start: 2024-01-15

## 2024-01-15 RX ADMIN — Medication 20 ML: at 13:53

## 2024-01-15 RX ADMIN — HEPARIN SODIUM (PORCINE) LOCK FLUSH IV SOLN 100 UNIT/ML 500 UNITS: 100 SOLUTION at 13:53

## 2024-03-20 ENCOUNTER — HOSPITAL ENCOUNTER (OUTPATIENT)
Dept: ONCOLOGY | Facility: HOSPITAL | Age: 44
Discharge: HOME | End: 2024-03-20
Admitting: INTERNAL MEDICINE
Payer: MEDICARE

## 2024-03-20 DIAGNOSIS — Z45.2 ENCOUNTER FOR ADJUSTMENT OR MANAGEMENT OF VASCULAR ACCESS DEVICE: Primary | ICD-10-CM

## 2024-03-20 DIAGNOSIS — Z95.828 PORT-A-CATH IN PLACE: ICD-10-CM

## 2024-03-20 PROCEDURE — 25010000002 HEPARIN LOCK FLUSH PER 10 UNITS: Performed by: INTERNAL MEDICINE

## 2024-03-20 PROCEDURE — 96523 IRRIG DRUG DELIVERY DEVICE: CPT

## 2024-03-20 RX ORDER — HEPARIN SODIUM (PORCINE) LOCK FLUSH IV SOLN 100 UNIT/ML 100 UNIT/ML
500 SOLUTION INTRAVENOUS AS NEEDED
OUTPATIENT
Start: 2024-03-20

## 2024-03-20 RX ORDER — SODIUM CHLORIDE 0.9 % (FLUSH) 0.9 %
20 SYRINGE (ML) INJECTION AS NEEDED
Status: DISCONTINUED | OUTPATIENT
Start: 2024-03-20 | End: 2024-03-21 | Stop reason: HOSPADM

## 2024-03-20 RX ORDER — SODIUM CHLORIDE 0.9 % (FLUSH) 0.9 %
20 SYRINGE (ML) INJECTION AS NEEDED
OUTPATIENT
Start: 2024-03-20

## 2024-03-20 RX ORDER — HEPARIN SODIUM (PORCINE) LOCK FLUSH IV SOLN 100 UNIT/ML 100 UNIT/ML
500 SOLUTION INTRAVENOUS AS NEEDED
Status: DISCONTINUED | OUTPATIENT
Start: 2024-03-20 | End: 2024-03-21 | Stop reason: HOSPADM

## 2024-03-20 RX ADMIN — Medication 20 ML: at 14:19

## 2024-03-20 RX ADMIN — HEPARIN 500 UNITS: 100 SYRINGE at 14:19

## 2024-04-22 ENCOUNTER — TELEPHONE (OUTPATIENT)
Dept: ONCOLOGY | Facility: HOSPITAL | Age: 44
End: 2024-04-22

## 2024-04-22 DIAGNOSIS — D69.1 QUALITATIVE PLATELET DISORDER: Primary | ICD-10-CM

## 2024-04-22 NOTE — TELEPHONE ENCOUNTER
The pt called and states that she believes that she needs a PLT infusion. The pt states that she has been having intermittent nose bleeds & bruising x2 days. The pt states that she woke up this am with silver dollar size petechiae on her neck.

## 2024-04-23 ENCOUNTER — HOSPITAL ENCOUNTER (OUTPATIENT)
Dept: INFUSION THERAPY | Facility: HOSPITAL | Age: 44
Discharge: HOME | End: 2024-04-23
Payer: MEDICARE

## 2024-04-23 ENCOUNTER — HOSPITAL ENCOUNTER (OUTPATIENT)
Dept: ONCOLOGY | Facility: HOSPITAL | Age: 44
Discharge: HOME | End: 2024-04-23
Payer: MEDICARE

## 2024-04-23 VITALS
HEIGHT: 65 IN | TEMPERATURE: 98.4 F | RESPIRATION RATE: 20 BRPM | HEART RATE: 67 BPM | DIASTOLIC BLOOD PRESSURE: 51 MMHG | BODY MASS INDEX: 32.18 KG/M2 | OXYGEN SATURATION: 98 % | WEIGHT: 193.12 LBS | SYSTOLIC BLOOD PRESSURE: 107 MMHG

## 2024-04-23 DIAGNOSIS — D69.1 QUALITATIVE PLATELET DISORDER: ICD-10-CM

## 2024-04-23 DIAGNOSIS — Z95.828 PORT-A-CATH IN PLACE: ICD-10-CM

## 2024-04-23 DIAGNOSIS — Z45.2 ENCOUNTER FOR ADJUSTMENT OR MANAGEMENT OF VASCULAR ACCESS DEVICE: Primary | ICD-10-CM

## 2024-04-23 LAB
ABO GROUP BLD: NORMAL
BASOPHILS # BLD AUTO: 0.03 10*3/MM3 (ref 0–0.2)
BASOPHILS NFR BLD AUTO: 0.6 % (ref 0–1.5)
BB HOLD TUBE: NORMAL
DEPRECATED RDW RBC AUTO: 42.7 FL (ref 37–54)
EOSINOPHIL # BLD AUTO: 0.18 10*3/MM3 (ref 0–0.4)
EOSINOPHIL NFR BLD AUTO: 3.6 % (ref 0.3–6.2)
ERYTHROCYTE [DISTWIDTH] IN BLOOD BY AUTOMATED COUNT: 12.4 % (ref 12.3–15.4)
HCT VFR BLD AUTO: 37.1 % (ref 34–46.6)
HGB BLD-MCNC: 12.5 G/DL (ref 12–15.9)
IMM GRANULOCYTES # BLD AUTO: 0.01 10*3/MM3 (ref 0–0.05)
IMM GRANULOCYTES NFR BLD AUTO: 0.2 % (ref 0–0.5)
LYMPHOCYTES # BLD AUTO: 1.89 10*3/MM3 (ref 0.7–3.1)
LYMPHOCYTES NFR BLD AUTO: 38.3 % (ref 19.6–45.3)
MCH RBC QN AUTO: 31 PG (ref 26.6–33)
MCHC RBC AUTO-ENTMCNC: 33.7 G/DL (ref 31.5–35.7)
MCV RBC AUTO: 92.1 FL (ref 79–97)
MONOCYTES # BLD AUTO: 0.31 10*3/MM3 (ref 0.1–0.9)
MONOCYTES NFR BLD AUTO: 6.3 % (ref 5–12)
NEUTROPHILS NFR BLD AUTO: 2.52 10*3/MM3 (ref 1.7–7)
NEUTROPHILS NFR BLD AUTO: 51 % (ref 42.7–76)
PLATELET # BLD AUTO: 213 10*3/MM3 (ref 140–450)
PMV BLD AUTO: 9 FL (ref 6–12)
RBC # BLD AUTO: 4.03 10*6/MM3 (ref 3.77–5.28)
RH BLD: POSITIVE
WBC NRBC COR # BLD AUTO: 4.94 10*3/MM3 (ref 3.4–10.8)

## 2024-04-23 PROCEDURE — 36591 DRAW BLOOD OFF VENOUS DEVICE: CPT

## 2024-04-23 PROCEDURE — 86901 BLOOD TYPING SEROLOGIC RH(D): CPT | Performed by: INTERNAL MEDICINE

## 2024-04-23 PROCEDURE — 96523 IRRIG DRUG DELIVERY DEVICE: CPT

## 2024-04-23 PROCEDURE — 25010000002 HEPARIN LOCK FLUSH PER 10 UNITS: Performed by: INTERNAL MEDICINE

## 2024-04-23 PROCEDURE — 86900 BLOOD TYPING SEROLOGIC ABO: CPT | Performed by: INTERNAL MEDICINE

## 2024-04-23 PROCEDURE — P9100 PATHOGEN TEST FOR PLATELETS: HCPCS

## 2024-04-23 PROCEDURE — P9037 PLATE PHERES LEUKOREDU IRRAD: HCPCS

## 2024-04-23 PROCEDURE — 85025 COMPLETE CBC W/AUTO DIFF WBC: CPT | Performed by: INTERNAL MEDICINE

## 2024-04-23 PROCEDURE — 36430 TRANSFUSION BLD/BLD COMPNT: CPT

## 2024-04-23 RX ORDER — SODIUM CHLORIDE 9 MG/ML
250 INJECTION, SOLUTION INTRAVENOUS AS NEEDED
Status: CANCELLED | OUTPATIENT
Start: 2024-04-23

## 2024-04-23 RX ORDER — HEPARIN SODIUM (PORCINE) LOCK FLUSH IV SOLN 100 UNIT/ML 100 UNIT/ML
500 SOLUTION INTRAVENOUS AS NEEDED
Status: DISCONTINUED | OUTPATIENT
Start: 2024-04-23 | End: 2024-04-25 | Stop reason: HOSPADM

## 2024-04-23 RX ORDER — HEPARIN SODIUM (PORCINE) LOCK FLUSH IV SOLN 100 UNIT/ML 100 UNIT/ML
500 SOLUTION INTRAVENOUS AS NEEDED
Status: DISCONTINUED | OUTPATIENT
Start: 2024-04-23 | End: 2024-04-24 | Stop reason: HOSPADM

## 2024-04-23 RX ORDER — SODIUM CHLORIDE 0.9 % (FLUSH) 0.9 %
20 SYRINGE (ML) INJECTION AS NEEDED
OUTPATIENT
Start: 2024-04-23

## 2024-04-23 RX ORDER — HEPARIN SODIUM (PORCINE) LOCK FLUSH IV SOLN 100 UNIT/ML 100 UNIT/ML
500 SOLUTION INTRAVENOUS AS NEEDED
OUTPATIENT
Start: 2024-04-23

## 2024-04-23 RX ORDER — HEPARIN SODIUM (PORCINE) LOCK FLUSH IV SOLN 100 UNIT/ML 100 UNIT/ML
500 SOLUTION INTRAVENOUS AS NEEDED
Status: CANCELLED | OUTPATIENT
Start: 2024-04-23

## 2024-04-23 RX ORDER — SODIUM CHLORIDE 0.9 % (FLUSH) 0.9 %
20 SYRINGE (ML) INJECTION AS NEEDED
Status: DISCONTINUED | OUTPATIENT
Start: 2024-04-23 | End: 2024-04-24 | Stop reason: HOSPADM

## 2024-04-23 RX ORDER — SODIUM CHLORIDE 9 MG/ML
250 INJECTION, SOLUTION INTRAVENOUS AS NEEDED
Status: DISCONTINUED | OUTPATIENT
Start: 2024-04-23 | End: 2024-04-25 | Stop reason: HOSPADM

## 2024-04-23 RX ADMIN — HEPARIN 500 UNITS: 100 SYRINGE at 08:50

## 2024-04-23 RX ADMIN — Medication 20 ML: at 08:50

## 2024-04-23 RX ADMIN — HEPARIN 500 UNITS: 100 SYRINGE at 14:38

## 2024-07-05 ENCOUNTER — TELEPHONE (OUTPATIENT)
Dept: ONCOLOGY | Facility: HOSPITAL | Age: 44
End: 2024-07-05
Payer: MEDICARE

## 2024-07-05 NOTE — TELEPHONE ENCOUNTER
Caller: Suzy Whitaker    Relationship to patient: Self    Best call back number: [phone]        Type of visit: PORT FLU    Requested date: PT PREFERS BEFORE 2PM OR THURS ALL DAY     If rescheduling, when is the original appointment: 5/10 (CAN)

## 2024-07-10 ENCOUNTER — TELEPHONE (OUTPATIENT)
Dept: ONCOLOGY | Facility: HOSPITAL | Age: 44
End: 2024-07-10

## 2024-07-10 NOTE — TELEPHONE ENCOUNTER
Caller: Suzy Whitaker    Relationship to patient: Self    Best call back number: [phone]    Type of visit: PORT FLUSH    Requested date: 7/11 ANYTIME     If rescheduling, when is the original appointment: 7/11 (CAN)     Additional notes:PT REQUESTING TO R/S PORT FLUSH APPT, SELF PAY DUE TO INS ISSUES

## 2024-07-10 NOTE — TELEPHONE ENCOUNTER
Caller: Suzy Whitaker     Relationship to patient: Self     Best call back number: [phone]     Type of visit: PORT FLUSH     Requested date: 7/11 ANYTIME      If rescheduling, when is the original appointment: 7/11 (CAN)      Additional notes:PT REQUESTING TO  R/S PORT FLUSH APPT, INSURANCE EFF 7/11/2024

## 2024-07-26 ENCOUNTER — APPOINTMENT (OUTPATIENT)
Dept: GENERAL RADIOLOGY | Facility: HOSPITAL | Age: 44
End: 2024-07-26
Payer: COMMERCIAL

## 2024-07-26 ENCOUNTER — HOSPITAL ENCOUNTER (EMERGENCY)
Facility: HOSPITAL | Age: 44
Discharge: HOME | End: 2024-07-26
Attending: EMERGENCY MEDICINE
Payer: COMMERCIAL

## 2024-07-26 VITALS
RESPIRATION RATE: 18 BRPM | DIASTOLIC BLOOD PRESSURE: 84 MMHG | SYSTOLIC BLOOD PRESSURE: 151 MMHG | WEIGHT: 181.22 LBS | HEIGHT: 65 IN | BODY MASS INDEX: 30.19 KG/M2 | TEMPERATURE: 98.5 F | HEART RATE: 78 BPM | OXYGEN SATURATION: 100 %

## 2024-07-26 DIAGNOSIS — Z45.2 ENCOUNTER FOR CARE RELATED TO PORT-A-CATH: Primary | ICD-10-CM

## 2024-07-26 PROCEDURE — 71045 X-RAY EXAM CHEST 1 VIEW: CPT

## 2024-07-26 PROCEDURE — 25010000002 HEPARIN LOCK FLUSH PER 10 UNITS: Performed by: EMERGENCY MEDICINE

## 2024-07-26 PROCEDURE — 99283 EMERGENCY DEPT VISIT LOW MDM: CPT

## 2024-07-26 RX ORDER — HEPARIN SODIUM (PORCINE) LOCK FLUSH IV SOLN 100 UNIT/ML 100 UNIT/ML
500 SOLUTION INTRAVENOUS ONCE
Status: COMPLETED | OUTPATIENT
Start: 2024-07-26 | End: 2024-07-26

## 2024-07-26 RX ORDER — LIDOCAINE HYDROCHLORIDE 20 MG/ML
JELLY TOPICAL ONCE
Status: COMPLETED | OUTPATIENT
Start: 2024-07-26 | End: 2024-07-26

## 2024-07-26 RX ORDER — SODIUM CHLORIDE 0.9 % (FLUSH) 0.9 %
10 SYRINGE (ML) INJECTION AS NEEDED
Status: DISCONTINUED | OUTPATIENT
Start: 2024-07-26 | End: 2024-07-27 | Stop reason: HOSPADM

## 2024-07-26 RX ADMIN — HEPARIN 500 UNITS: 100 SYRINGE at 22:54

## 2024-07-26 RX ADMIN — LIDOCAINE HYDROCHLORIDE: 20 JELLY TOPICAL at 22:38

## 2024-07-27 NOTE — ED PROVIDER NOTES
Time: 9:52 PM EDT  Date of encounter:  7/26/2024  Independent Historian/Clinical History and Information was obtained by:   Patient    History is limited by: N/A    Chief Complaint: vascular access problem      History of Present Illness:  Patient is a 44 y.o. year old female who presents to the emergency department for evaluation of vascular access problem.  Patient states she had right chest port placed in 2005 for bleeding disorder.  Has not had it flushed in 11 weeks.  Patient states she is having pain in the area where the device and its.  Patient has a follow-up appointment on Wednesday.    HPI    Patient Care Team  Primary Care Provider: Victoria Russell APRN    Past Medical History:     Allergies   Allergen Reactions    Adhesive Tape Rash    Morphine Other (See Comments)     CAUSES CHEST PAIN    Aspirin GI Bleeding     Past Medical History:   Diagnosis Date    Anxiety     Bleeding disorder     Blood disease     Depression     Disease of thyroid gland     Migraine     Mood disorder     Ovarian cyst     Urinary tract infection      Past Surgical History:   Procedure Laterality Date    COLONOSCOPY      GALLBLADDER SURGERY      HYSTERECTOMY      PORTACATH PLACEMENT      WISDOM TOOTH EXTRACTION       Family History   Problem Relation Age of Onset    Diabetes Mother     Other Mother         blood diseases    Stroke Father     Heart disease Father     Cancer Father     Diabetes Sister     Kidney cancer Maternal Grandmother     Breast cancer Neg Hx     Colon cancer Neg Hx     Ovarian cancer Neg Hx     Uterine cancer Neg Hx     Prostate cancer Neg Hx        Home Medications:  Prior to Admission medications    Medication Sig Start Date End Date Taking? Authorizing Provider   amoxicillin-clavulanate (AUGMENTIN) 875-125 MG per tablet Take 1 tablet by mouth Every 12 (Twelve) Hours. 12/5/23   Debbie Linton MD   ARIPiprazole (ABILIFY) 5 MG tablet Take 1 tablet by mouth Daily.    Debbie Linton MD    benzonatate (TESSALON) 200 MG capsule Take 1 capsule by mouth 3 (Three) Times a Day As Needed for Cough. 12/5/23   Debbie Linton MD   butalbital-acetaminophen-caffeine (FIORICET, ESGIC) -40 MG per tablet Take 1 tablet by mouth 4 (Four) Times a Day As Needed. 4/19/21   Debbie Linton MD   Cariprazine HCl (Vraylar) 1.5 MG capsule capsule     Debbie Linton MD   Cetirizine HCl 10 MG capsule Take 10 mg by mouth Daily.    Debbie Linton MD   clotrimazole-betamethasone (LOTRISONE) 1-0.05 % cream APPLY TO THE AFFECTED AREA TOPICALLY TWICE DAILY 11/13/23   Debbie Linton MD   diclofenac (VOLTAREN) 75 MG EC tablet Take 1 tablet by mouth 2 (Two) Times a Day As Needed. 4/27/21   Debbie Linton MD   fluticasone (FLONASE) 50 MCG/ACT nasal spray 2 sprays by Each Nare route Daily.    Debbie Linton MD   ibuprofen (ADVIL,MOTRIN) 800 MG tablet Take 1 tablet by mouth 4 (Four) Times a Day As Needed. 4/19/21   Debbie Linton MD   levothyroxine (SYNTHROID, LEVOTHROID) 25 MCG tablet Take 1 tablet by mouth Daily. 10/17/22   Debbie Linton MD   montelukast (SINGULAIR) 10 MG tablet Take 1 tablet by mouth Every Night.    Debbie Linton MD   phentermine 37.5 MG capsule Take 1 capsule by mouth Daily.    Debbie Linton MD   predniSONE (DELTASONE) 20 MG tablet Take 1 tablet by mouth Every 12 (Twelve) Hours. 12/5/23   Debbie Linton MD   venlafaxine XR (EFFEXOR-XR) 75 MG 24 hr capsule Take 1 capsule by mouth Daily.    Debbie Linton MD        Social History:   Social History     Tobacco Use    Smoking status: Never     Passive exposure: Past    Smokeless tobacco: Never    Tobacco comments:     01/01/1990   Vaping Use    Vaping status: Never Used   Substance Use Topics    Alcohol use: Not Currently    Drug use: Yes     Types: Marijuana         Review of Systems:  Review of Systems   Constitutional:  Negative for chills, diaphoresis, fatigue and  "fever.   Respiratory:  Negative for cough and shortness of breath.    Cardiovascular:  Positive for chest pain. Negative for palpitations and leg swelling.   Skin:  Negative for color change and wound.        Physical Exam:  /84 (BP Location: Right arm, Patient Position: Sitting)   Pulse 78   Temp 98.5 °F (36.9 °C) (Oral)   Resp 18   Ht 165.1 cm (65\")   Wt 82.2 kg (181 lb 3.5 oz)   SpO2 100%   BMI 30.16 kg/m²     Physical Exam  Exam conducted with a chaperone present.   HENT:      Head: Normocephalic.      Mouth/Throat:      Mouth: Mucous membranes are moist.   Eyes:      Pupils: Pupils are equal, round, and reactive to light.   Pulmonary:      Effort: Pulmonary effort is normal.   Chest:      Comments: Surgical scar with underlying port.  Port area palpated, no surrounding erythema or crepitus.  Abdominal:      General: There is no distension.   Musculoskeletal:      Cervical back: Neck supple.   Skin:     General: Skin is warm and dry.   Neurological:      General: No focal deficit present.      Mental Status: She is alert and oriented to person, place, and time.   Psychiatric:         Mood and Affect: Mood normal.         Behavior: Behavior normal.                  Procedures:  Procedures      Medical Decision Making:      Comorbidities that affect care:    Thyroid Disease    External Notes reviewed:    None      The following orders were placed and all results were independently analyzed by me:  Orders Placed This Encounter   Procedures    XR Chest 1 View    Access VAD       Medications Given in the Emergency Department:  Medications   sodium chloride 0.9 % flush 10 mL (has no administration in time range)   Lidocaine HCl gel (XYLOCAINE) urethral/mucosal syringe ( Topical Given 7/26/24 2238)   heparin injection 500 Units (500 Units Intravenous Given 7/26/24 2254)        ED Course:    ED Course as of 07/26/24 2334 Fri Jul 26, 2024 2154 --- PROVIDER IN TRIAGE NOTE ---    The patient was evaluated " by me, Aislinn Claudio in triage. Orders were placed and the patient is currently awaiting disposition.    [AJ]      ED Course User Index  [AJ] Aislinn Claudio PA-C       Labs:    Lab Results (last 24 hours)       ** No results found for the last 24 hours. **             Imaging:    XR Chest 1 View    Result Date: 7/26/2024  XR CHEST 1 VW Date of Exam: 7/26/2024 9:20 PM EDT Indication: port placement verification Comparison: Chest radiograph dated 3/6/2023 Findings: There is a right-sided chest port tip terminating at the mid SVC. The cardiomediastinal silhouette is within normal limits. Pulmonary vascularity appears normal. There is no focal airspace consolidation, pleural effusion, or pneumothorax. There are no acute osseous findings of the chest.     Impression: 1. Right sided chest port tip terminating at the mid SVC. Electronically Signed: Alec Morse  7/26/2024 9:32 PM EDT  Workstation ID: [id number]       Differential Diagnosis and Discussion:    Myalgia: Differential diagnosis includes but is not limited to muscle overuse or strain, infections, inflammatory conditions, autoimmune diseases, medications, metabolic disorders, fibromyalgia, vascular disorders, neurological conditions, psychological factors, toxins, and muscle disorders.    All X-rays impressions were independently interpreted by me.    MDM  Number of Diagnoses or Management Options  Encounter for care related to Port-a-Cath  Diagnosis management comments: Patient presents ED for vascular access problem.  Patient states that she has not had a flush in 11 weeks and feels like she is having pain around this area.  Chest x-ray was obtained which did not show any significant migration from 2023.  There is no erythema or redness surrounding to suggest infection.  I gave the patient the option to wait until Wednesday for flushing or flushing it in the ER, I explained the risks and benefits of both.  Patient decided that she would like the port  flushed at this time.The patient's port was accessed and provided good blood return, then flushed with 20 mL of NS and 500U/5mL of heparin per protocol, then de-accessed.       Amount and/or Complexity of Data Reviewed  Tests in the radiology section of CPT®: reviewed           Patient Care Considerations:    LABS: I considered ordering labs, however patient had no indications for infection of line.      Consultants/Shared Management Plan:    None    Social Determinants of Health:    Patient is independent, reliable, and has access to care.       Disposition and Care Coordination:    Discharged: The patient is suitable and stable for discharge with no need for consideration of admission.    I have explained the patient´s condition, diagnoses and treatment plan based on the information available to me at this time. I have answered questions and addressed any concerns. The patient has a good  understanding of the patient´s diagnosis, condition, and treatment plan as can be expected at this point. The vital signs have been stable. The patient´s condition is stable and appropriate for discharge from the emergency department.      The patient will pursue further outpatient evaluation with the primary care physician or other designated or consulting physician as outlined in the discharge instructions. They are agreeable to this plan of care and follow-up instructions have been explained in detail. The patient has received these instructions in written format and has expressed an understanding of the discharge instructions. The patient is aware that any significant change in condition or worsening of symptoms should prompt an immediate return to this or the closest emergency department or call to 911.    Final diagnoses:   Encounter for care related to Port-a-Cath        ED Disposition       ED Disposition   Discharge    Condition   Stable    Comment   --               This medical record created using voice recognition  software.             Seaver, Alyce B, APRN  07/26/24 [id number]

## 2024-07-27 NOTE — ED NOTES
The patient's port was accessed and provided good blood return, then flushed with 20 mL of NS and 500U/5mL of heparin per protocol, then de-accessed.

## 2024-07-27 NOTE — ED NOTES
pain is located directly over her port site in the right chest area. there is no noticeable swelling or discoloration of the area.

## 2024-07-27 NOTE — DISCHARGE INSTRUCTIONS
Follow-up with your primary care provider.  Keep appointment with vascular access team.  Return to ER if symptoms worsen or fail to improve.    Keep area clean and dry.

## 2024-09-08 ENCOUNTER — APPOINTMENT (OUTPATIENT)
Dept: GENERAL RADIOLOGY | Facility: HOSPITAL | Age: 44
End: 2024-09-08
Payer: MEDICAID

## 2024-09-08 ENCOUNTER — APPOINTMENT (OUTPATIENT)
Dept: CT IMAGING | Facility: HOSPITAL | Age: 44
End: 2024-09-08
Payer: MEDICAID

## 2024-09-08 ENCOUNTER — HOSPITAL ENCOUNTER (EMERGENCY)
Facility: HOSPITAL | Age: 44
Discharge: HOME | End: 2024-09-08
Attending: EMERGENCY MEDICINE | Admitting: EMERGENCY MEDICINE
Payer: MEDICAID

## 2024-09-08 VITALS
RESPIRATION RATE: 18 BRPM | BODY MASS INDEX: 31.18 KG/M2 | SYSTOLIC BLOOD PRESSURE: 100 MMHG | HEIGHT: 65 IN | TEMPERATURE: 98.1 F | DIASTOLIC BLOOD PRESSURE: 60 MMHG | HEART RATE: 59 BPM | OXYGEN SATURATION: 98 % | WEIGHT: 187.17 LBS

## 2024-09-08 DIAGNOSIS — M54.2 NECK PAIN ON RIGHT SIDE: Primary | ICD-10-CM

## 2024-09-08 LAB
ALBUMIN SERPL-MCNC: 4 G/DL (ref 3.5–5.2)
ALBUMIN/GLOB SERPL: 1.4 G/DL
ALP SERPL-CCNC: 67 U/L (ref 39–117)
ALT SERPL W P-5'-P-CCNC: 32 U/L (ref 1–33)
ANION GAP SERPL CALCULATED.3IONS-SCNC: 7.2 MMOL/L (ref 5–15)
AST SERPL-CCNC: 25 U/L (ref 1–32)
BASOPHILS # BLD AUTO: 0.04 10*3/MM3 (ref 0–0.2)
BASOPHILS NFR BLD AUTO: 0.6 % (ref 0–1.5)
BILIRUB SERPL-MCNC: 0.4 MG/DL (ref 0–1.2)
BUN SERPL-MCNC: 9 MG/DL (ref 6–20)
BUN/CREAT SERPL: 16.1 (ref 7–25)
CALCIUM SPEC-SCNC: 9.1 MG/DL (ref 8.6–10.5)
CHLORIDE SERPL-SCNC: 103 MMOL/L (ref 98–107)
CO2 SERPL-SCNC: 29.8 MMOL/L (ref 22–29)
CREAT SERPL-MCNC: 0.56 MG/DL (ref 0.57–1)
DEPRECATED RDW RBC AUTO: 43.8 FL (ref 37–54)
EGFRCR SERPLBLD CKD-EPI 2021: 116.3 ML/MIN/1.73
EOSINOPHIL # BLD AUTO: 0.13 10*3/MM3 (ref 0–0.4)
EOSINOPHIL NFR BLD AUTO: 2.1 % (ref 0.3–6.2)
ERYTHROCYTE [DISTWIDTH] IN BLOOD BY AUTOMATED COUNT: 13.1 % (ref 12.3–15.4)
GLOBULIN UR ELPH-MCNC: 2.9 GM/DL
GLUCOSE SERPL-MCNC: 87 MG/DL (ref 65–99)
HCT VFR BLD AUTO: 38.6 % (ref 34–46.6)
HGB BLD-MCNC: 13 G/DL (ref 12–15.9)
HOLD SPECIMEN: NORMAL
HOLD SPECIMEN: NORMAL
IMM GRANULOCYTES # BLD AUTO: 0.01 10*3/MM3 (ref 0–0.05)
IMM GRANULOCYTES NFR BLD AUTO: 0.2 % (ref 0–0.5)
LIPASE SERPL-CCNC: 12 U/L (ref 13–60)
LYMPHOCYTES # BLD AUTO: 1.64 10*3/MM3 (ref 0.7–3.1)
LYMPHOCYTES NFR BLD AUTO: 25.9 % (ref 19.6–45.3)
MAGNESIUM SERPL-MCNC: 1.8 MG/DL (ref 1.6–2.6)
MCH RBC QN AUTO: 30.9 PG (ref 26.6–33)
MCHC RBC AUTO-ENTMCNC: 33.7 G/DL (ref 31.5–35.7)
MCV RBC AUTO: 91.7 FL (ref 79–97)
MONOCYTES # BLD AUTO: 0.44 10*3/MM3 (ref 0.1–0.9)
MONOCYTES NFR BLD AUTO: 7 % (ref 5–12)
NEUTROPHILS NFR BLD AUTO: 4.06 10*3/MM3 (ref 1.7–7)
NEUTROPHILS NFR BLD AUTO: 64.2 % (ref 42.7–76)
NRBC BLD AUTO-RTO: 0 /100 WBC (ref 0–0.2)
NT-PROBNP SERPL-MCNC: 98.2 PG/ML (ref 0–450)
PLATELET # BLD AUTO: 238 10*3/MM3 (ref 140–450)
PMV BLD AUTO: 8.8 FL (ref 6–12)
POTASSIUM SERPL-SCNC: 3.5 MMOL/L (ref 3.5–5.2)
PROT SERPL-MCNC: 6.9 G/DL (ref 6–8.5)
RBC # BLD AUTO: 4.21 10*6/MM3 (ref 3.77–5.28)
SODIUM SERPL-SCNC: 140 MMOL/L (ref 136–145)
TROPONIN T SERPL HS-MCNC: <6 NG/L
TSH SERPL DL<=0.05 MIU/L-ACNC: 0.83 UIU/ML (ref 0.27–4.2)
WBC NRBC COR # BLD AUTO: 6.32 10*3/MM3 (ref 3.4–10.8)
WHOLE BLOOD HOLD COAG: NORMAL
WHOLE BLOOD HOLD SPECIMEN: NORMAL

## 2024-09-08 PROCEDURE — 25510000001 IOPAMIDOL PER 1 ML: Performed by: EMERGENCY MEDICINE

## 2024-09-08 PROCEDURE — 83735 ASSAY OF MAGNESIUM: CPT

## 2024-09-08 PROCEDURE — 96374 THER/PROPH/DIAG INJ IV PUSH: CPT

## 2024-09-08 PROCEDURE — 93005 ELECTROCARDIOGRAM TRACING: CPT | Performed by: EMERGENCY MEDICINE

## 2024-09-08 PROCEDURE — 85025 COMPLETE CBC W/AUTO DIFF WBC: CPT | Performed by: EMERGENCY MEDICINE

## 2024-09-08 PROCEDURE — 80053 COMPREHEN METABOLIC PANEL: CPT

## 2024-09-08 PROCEDURE — 99285 EMERGENCY DEPT VISIT HI MDM: CPT

## 2024-09-08 PROCEDURE — 84484 ASSAY OF TROPONIN QUANT: CPT

## 2024-09-08 PROCEDURE — 70491 CT SOFT TISSUE NECK W/DYE: CPT

## 2024-09-08 PROCEDURE — 83690 ASSAY OF LIPASE: CPT

## 2024-09-08 PROCEDURE — 84443 ASSAY THYROID STIM HORMONE: CPT | Performed by: EMERGENCY MEDICINE

## 2024-09-08 PROCEDURE — 93005 ELECTROCARDIOGRAM TRACING: CPT

## 2024-09-08 PROCEDURE — 25010000002 KETOROLAC TROMETHAMINE PER 15 MG: Performed by: EMERGENCY MEDICINE

## 2024-09-08 PROCEDURE — 83880 ASSAY OF NATRIURETIC PEPTIDE: CPT

## 2024-09-08 PROCEDURE — 71045 X-RAY EXAM CHEST 1 VIEW: CPT

## 2024-09-08 RX ORDER — IOPAMIDOL 755 MG/ML
100 INJECTION, SOLUTION INTRAVASCULAR
Status: COMPLETED | OUTPATIENT
Start: 2024-09-08 | End: 2024-09-08

## 2024-09-08 RX ORDER — KETOROLAC TROMETHAMINE 30 MG/ML
30 INJECTION, SOLUTION INTRAMUSCULAR; INTRAVENOUS ONCE
Status: COMPLETED | OUTPATIENT
Start: 2024-09-08 | End: 2024-09-08

## 2024-09-08 RX ORDER — CYCLOBENZAPRINE HCL 10 MG
10 TABLET ORAL 3 TIMES DAILY PRN
Qty: 21 TABLET | Refills: 0 | Status: SHIPPED | OUTPATIENT
Start: 2024-09-08

## 2024-09-08 RX ORDER — SODIUM CHLORIDE 0.9 % (FLUSH) 0.9 %
10 SYRINGE (ML) INJECTION AS NEEDED
Status: DISCONTINUED | OUTPATIENT
Start: 2024-09-08 | End: 2024-09-08 | Stop reason: HOSPADM

## 2024-09-08 RX ORDER — DICLOFENAC SODIUM 75 MG/1
75 TABLET, DELAYED RELEASE ORAL 2 TIMES DAILY PRN
Qty: 20 TABLET | Refills: 0 | Status: SHIPPED | OUTPATIENT
Start: 2024-09-08

## 2024-09-08 RX ADMIN — IOPAMIDOL 100 ML: 755 INJECTION, SOLUTION INTRAVENOUS at 13:55

## 2024-09-08 RX ADMIN — KETOROLAC TROMETHAMINE 30 MG: 30 INJECTION, SOLUTION INTRAMUSCULAR; INTRAVENOUS at 13:14

## 2024-09-08 NOTE — DISCHARGE INSTRUCTIONS
Avoid any strenuous activities.  This includes lifting, pushing, or pulling.  Take the medications as prescribed for comfort.  Apply warm and/or cold compresses to the right side of your neck.  Apply for 20 to 30 minutes 3-4 times per day as needed.  Follow-up with your primary care provider in 1 week if symptoms are not improving.  Return to the ER for any new symptoms or any other concerns that may arise.

## 2024-09-08 NOTE — ED PROVIDER NOTES
Time: 3:45 PM EDT  Date of encounter:  9/8/2024  Independent Historian/Clinical History and Information was obtained by:   Patient  Chief Complaint: Neck/throat pain    History is limited by: N/A    History of Present Illness:  Patient is a 44 y.o. year old female who presents to the emergency department for evaluation of right-sided neck/throat pain.  Patient reports symptoms began about 2 days ago.  Patient complains of pain on the right side of her neck.  Patient describes as a pulsation.  Patient is a pain rates down towards her collarbone and towards the front of her sternum.  Patient states the pain is worse with bending over but also laying down.  Patient also reports worsening pain with deep breathing.  She states the neck pain is constant.  Patient is limited range of motion due to pain.  Patient states that the really makes the pain better.  She denies any fevers.  She denies any dysphagia.  Patient denies any shortness of breath or vomiting.    HPI    Patient Care Team  Primary Care Provider: Victoria Russell APRN    Past Medical History:     Allergies   Allergen Reactions    Adhesive Tape Rash    Morphine Other (See Comments)     CAUSES CHEST PAIN    Aspirin GI Bleeding     Past Medical History:   Diagnosis Date    Anxiety     Bleeding disorder     Blood disease     Depression     Disease of thyroid gland     Migraine     Mood disorder     Ovarian cyst     Urinary tract infection      Past Surgical History:   Procedure Laterality Date    COLONOSCOPY      GALLBLADDER SURGERY      HYSTERECTOMY      PORTACATH PLACEMENT      WISDOM TOOTH EXTRACTION       Family History   Problem Relation Age of Onset    Diabetes Mother     Other Mother         blood diseases    Stroke Father     Heart disease Father     Cancer Father     Diabetes Sister     Kidney cancer Maternal Grandmother     Breast cancer Neg Hx     Colon cancer Neg Hx     Ovarian cancer Neg Hx     Uterine cancer Neg Hx     Prostate cancer Neg Hx         Home Medications:  Prior to Admission medications    Medication Sig Start Date End Date Taking? Authorizing Provider   levothyroxine (SYNTHROID, LEVOTHROID) 25 MCG tablet Take 1 tablet by mouth Daily. 10/17/22   Debbie Linton MD   venlafaxine XR (EFFEXOR-XR) 75 MG 24 hr capsule Take 1 capsule by mouth Daily.    Debbie Linton MD   amoxicillin-clavulanate (AUGMENTIN) 875-125 MG per tablet Take 1 tablet by mouth Every 12 (Twelve) Hours. 12/5/23 9/8/24  Debbie Linton MD   ARIPiprazole (ABILIFY) 5 MG tablet Take 1 tablet by mouth Daily.  9/8/24  Debbie Linton MD   benzonatate (TESSALON) 200 MG capsule Take 1 capsule by mouth 3 (Three) Times a Day As Needed for Cough. 12/5/23 9/8/24  Debbie Linton MD   butalbital-acetaminophen-caffeine (FIORICET, ESGIC) -40 MG per tablet Take 1 tablet by mouth 4 (Four) Times a Day As Needed. 4/19/21 9/8/24  Debbie Linton MD   Cariprazine HCl (Vraylar) 1.5 MG capsule capsule   9/8/24  Debbie Linton MD   Cetirizine HCl 10 MG capsule Take 10 mg by mouth Daily.  9/8/24  Debbie Linton MD   clotrimazole-betamethasone (LOTRISONE) 1-0.05 % cream APPLY TO THE AFFECTED AREA TOPICALLY TWICE DAILY 11/13/23 9/8/24  Debbie Linton MD   diclofenac (VOLTAREN) 75 MG EC tablet Take 1 tablet by mouth 2 (Two) Times a Day As Needed. 4/27/21 9/8/24  Debbie Linton MD   fluticasone (FLONASE) 50 MCG/ACT nasal spray 2 sprays by Each Nare route Daily.  9/8/24  Debbie Linton MD   ibuprofen (ADVIL,MOTRIN) 800 MG tablet Take 1 tablet by mouth 4 (Four) Times a Day As Needed. 4/19/21 9/8/24  Debbie Linton MD   montelukast (SINGULAIR) 10 MG tablet Take 1 tablet by mouth Every Night.  9/8/24  Debbie Linton MD   phentermine 37.5 MG capsule Take 1 capsule by mouth Daily.  9/8/24  Provider, MD Debbie   predniSONE (DELTASONE) 20 MG tablet Take 1 tablet by mouth Every 12 (Twelve) Hours. 12/5/23 9/8/24   "Provider, Historical, MD        Social History:   Social History     Tobacco Use    Smoking status: Never     Passive exposure: Past    Smokeless tobacco: Never    Tobacco comments:     01/01/1990   Vaping Use    Vaping status: Never Used   Substance Use Topics    Alcohol use: Not Currently    Drug use: Yes     Types: Marijuana         Review of Systems:  Review of Systems   Constitutional:  Negative for chills and fever.   HENT:  Negative for congestion, ear pain, nosebleeds, postnasal drip, sore throat, trouble swallowing and voice change.    Eyes:  Negative for pain.   Respiratory:  Negative for cough, chest tightness and shortness of breath.    Cardiovascular:  Negative for chest pain.   Gastrointestinal:  Negative for abdominal pain, diarrhea, nausea and vomiting.   Genitourinary:  Negative for flank pain and hematuria.   Musculoskeletal:  Positive for neck pain. Negative for joint swelling.   Skin:  Negative for pallor.   Neurological:  Negative for seizures and headaches.   All other systems reviewed and are negative.       Physical Exam:  /60   Pulse 60   Temp 98.1 °F (36.7 °C) (Oral)   Resp 18   Ht 165.1 cm (65\")   Wt 84.9 kg (187 lb 2.7 oz)   SpO2 95%   BMI 31.15 kg/m²     Physical Exam    Vital signs were reviewed under triage note.  General appearance - Patient appears well-developed and well-nourished.  Patient is in no acute distress.  Head - Normocephalic, atraumatic.  Pupils - Equal, round, reactive to light.  Extraocular muscles are intact.  Conjunctiva is clear.  Nasal - Normal inspection.  No evidence of trauma or epistaxis.  Tympanic membranes - Gray, intact without erythema or retractions.  Oral mucosa - Pink and moist without lesions or erythema.  Uvula is midline.  Chest wall - Atraumatic.  Chest wall is nontender.  There are no vesicular rashes noted.  Neck - Supple.  Trachea was midline.  Patient has some tenderness along her right strap muscles.  There is no palpable " "lymphadenopathy or thyromegaly.  There are no meningeal signs  Lungs - Clear to auscultation and percussion bilaterally.  Heart - Regular rate and rhythm without any murmurs, clicks, or gallops.  Abdomen - Soft.  Bowel sounds are present.  There is no palpable tenderness.  There is no rebound, guarding, or rigidity.  There are no palpable masses.  There are no pulsatile masses.  Back - Spine is straight and midline.  There is no CVA tenderness.  Extremities - Intact x4 with full range of motion.  There is no palpable edema.  Pulses are intact x4 and equal.  Neurologic - Patient is awake, alert, and oriented x3.  Cranial nerves II through XII are grossly intact.  Motor and sensory functions grossly intact.  Cerebellar function was normal.  Integument - There are no rashes.  There are no petechia or purpura lesions noted.  There are no vesicular lesions noted.           Procedures:  Procedures      Medical Decision Making:      Comorbidities that affect care:    Anxiety, depression, migraines, hypothyroidism, \"bleeding disorder\"    External Notes reviewed:    Previous Clinic Note: Office visit with JEROMY Mcneill on 1/17/2024 was reviewed by me.      The following orders were placed and all results were independently analyzed by me:  Orders Placed This Encounter   Procedures    XR Chest 1 View    CT Soft Tissue Neck With Contrast    [place] Draw    High Sensitivity Troponin T    Comprehensive Metabolic Panel    Lipase    BNP    Magnesium    CBC Auto Differential    TSH    NPO Diet NPO Type: Strict NPO    Undress & Gown    Continuous Pulse Oximetry    Oxygen Therapy- Nasal Cannula; Titrate 1-6 LPM Per SpO2; 90 - 95%    ECG 12 Lead ED Triage Standing Order; Chest Pain    ECG 12 Lead ED Triage Standing Order; Chest Pain    Insert Peripheral IV    CBC & Differential    Green Top (Gel)    Lavender Top    Gold Top - SST    Light Blue Top       Medications Given in the Emergency Department:  Medications   sodium " chloride 0.9 % flush 10 mL (has no administration in time range)   ketorolac (TORADOL) injection 30 mg (30 mg Intravenous Given 9/8/24 1314)   iopamidol (ISOVUE-370) 76 % injection 100 mL (100 mL Intravenous Given 9/8/24 1355)        ED Course:    ED Course as of 09/08/24 1548   Sun Sep 08, 2024   1526 EKG performed 1127 was interpreted by me to show a normal sinus rhythm with a ventricular rate of 66 bpm.  The UT intervals are 92 ms.  P waves are normal.  QRS interval is normal.  Axis was 80 degrees.  There is no acute ischemic ST or T wave change identified.  QT corrected was 416 ms. [TB]   1526 EKG performed at 1331 was interpreted by me to show a sinus bradycardia with a ventricular rate of 57 bpm.  The UT interval was 190 ms.  P waves are normal.  QRS interval is normal.  Axis was at 6 degrees.  There was no acute ischemic ST or T wave change identified.  QT corrected was 416 ms.  This EKG was unchanged from EKG done earlier at 11:27 AM. [TB]      ED Course User Index  [TB] Mando Ramirez DO       The patient was seen and evaluated in the ED by me.  The above history and physical examination was performed as documented.  Diagnostic data was obtained.  Results reviewed.  ED workup was essentially unremarkable including the CT scan of the neck as well as cardiac workup.  Based on the patient's history and physical examination this appears to be more of a muscle skeletal neck strap muscle.  Patient replaced on anti-inflammatories and muscle relaxers.  Patient has been given other conservative measures.  Patient advised to follow-up with her primary care provider in 1 week if symptoms are not improving and of course return to the ER for any change or worsening of her conditions.    Labs:    Lab Results (last 24 hours)       Procedure Component Value Units Date/Time    High Sensitivity Troponin T [[id number]]  (Normal) Collected: 09/08/24 1132    Specimen: Blood Updated: 09/08/24 1157     HS Troponin T <6 ng/L      Narrative:      High Sensitive Troponin T Reference Range:  <14.0 ng/L- Negative Female for AMI  <22.0 ng/L- Negative Male for AMI  >=14 - Abnormal Female indicating possible myocardial injury.  >=22 - Abnormal Male indicating possible myocardial injury.   Clinicians would have to utilize clinical acumen, EKG, Troponin, and serial changes to determine if it is an Acute Myocardial Infarction or myocardial injury due to an underlying chronic condition.         CBC & Differential [[id number]]  (Normal) Collected: 09/08/24 1132    Specimen: Blood Updated: 09/08/24 1138    Narrative:      The following orders were created for panel order CBC & Differential.  Procedure                               Abnormality         Status                     ---------                               -----------         ------                     CBC Auto Differential[[id number]]        Normal              Final result                 Please view results for these tests on the individual orders.    Comprehensive Metabolic Panel [[id number]]  (Abnormal) Collected: 09/08/24 1132    Specimen: Blood Updated: 09/08/24 1157     Glucose 87 mg/dL      BUN 9 mg/dL      Creatinine 0.56 mg/dL      Sodium 140 mmol/L      Potassium 3.5 mmol/L      Chloride 103 mmol/L      CO2 29.8 mmol/L      Calcium 9.1 mg/dL      Total Protein 6.9 g/dL      Albumin 4.0 g/dL      ALT (SGPT) 32 U/L      AST (SGOT) 25 U/L      Alkaline Phosphatase 67 U/L      Total Bilirubin 0.4 mg/dL      Globulin 2.9 gm/dL      A/G Ratio 1.4 g/dL      BUN/Creatinine Ratio 16.1     Anion Gap 7.2 mmol/L      eGFR 116.3 mL/min/1.73     Narrative:      GFR Normal >60  Chronic Kidney Disease <60  Kidney Failure <15      Lipase [[id number]]  (Abnormal) Collected: 09/08/24 1132    Specimen: Blood Updated: 09/08/24 1157     Lipase 12 U/L     BNP [[id number]]  (Normal) Collected: 09/08/24 1132    Specimen: Blood Updated: 09/08/24 1155     proBNP 98.2 pg/mL     Narrative:      This assay is used as  an aid in the diagnosis of individuals suspected of having heart failure. It can be used as an aid in the diagnosis of acute decompensated heart failure (ADHF) in patients presenting with signs and symptoms of ADHF to the emergency department (ED). In addition, NT-proBNP of <300 pg/mL indicates ADHF is not likely.    Age Range Result Interpretation  NT-proBNP Concentration (pg/mL:      <50             Positive            >450                   Gray                 300-450                    Negative             <300    50-75           Positive            >900                  Gray                300-900                  Negative            <300      >75             Positive            >1800                  Gray                300-1800                  Negative            <300    Magnesium [[id number]]  (Normal) Collected: 09/08/24 1132    Specimen: Blood Updated: 09/08/24 1157     Magnesium 1.8 mg/dL     CBC Auto Differential [[id number]]  (Normal) Collected: 09/08/24 1132    Specimen: Blood Updated: 09/08/24 1138     WBC 6.32 10*3/mm3      RBC 4.21 10*6/mm3      Hemoglobin 13.0 g/dL      Hematocrit 38.6 %      MCV 91.7 fL      MCH 30.9 pg      MCHC 33.7 g/dL      RDW 13.1 %      RDW-SD 43.8 fl      MPV 8.8 fL      Platelets 238 10*3/mm3      Neutrophil % 64.2 %      Lymphocyte % 25.9 %      Monocyte % 7.0 %      Eosinophil % 2.1 %      Basophil % 0.6 %      Immature Grans % 0.2 %      Neutrophils, Absolute 4.06 10*3/mm3      Lymphocytes, Absolute 1.64 10*3/mm3      Monocytes, Absolute 0.44 10*3/mm3      Eosinophils, Absolute 0.13 10*3/mm3      Basophils, Absolute 0.04 10*3/mm3      Immature Grans, Absolute 0.01 10*3/mm3      nRBC 0.0 /100 WBC     TSH [[id number]]  (Normal) Collected: 09/08/24 1132    Specimen: Blood Updated: 09/08/24 1331     TSH 0.827 uIU/mL              Imaging:    CT Soft Tissue Neck With Contrast    Result Date: 9/8/2024  CT SOFT TISSUE NECK W CONTRAST Date of Exam: 9/8/2024 1:45 PM EDT  Indication: Neck pain. Comparison: 3/6/2022 Technique: Axial CT images were obtained of the neck after the uneventful intravenous administration of iodinated contrast.  Reconstructed coronal and sagittal images were also obtained. Automated exposure control and iterative construction methods were used. Findings: No abnormal intracranial enhancement. No intracranial mass or hemorrhage. Intracranial venous sinuses appear patent. Orbital and periorbital soft tissues are normal. No intracranial mass. No mass effect. Orbital and parable soft tissues are normal. The parotid and submandibular glands are normal. The paranasal sinuses and mastoid air cells are aerated. No adenopathy. The nasopharynx is clear. The base of the tongue is normal. The thyroid gland is normal. The subglottic airway is clear. Right-sided chemotherapy infusion port. The lung apices are clear.     No soft tissue masses. No adenopathy. Electronically Signed: Mitesh Garcia MD  9/8/2024 2:01 PM EDT  Workstation ID: [id number]    XR Chest 1 View    Result Date: 9/8/2024  XR CHEST 1 VW Date of Exam: 9/8/2024 11:44 AM EDT Indication: Chest Pain Triage Protocol Comparison: Chest radiograph dated 7/26/2024 Findings: There is a right-sided chest port tip terminating at the cavoatrial junction. The cardiomediastinal silhouette is within normal limits. Pulmonary vascularity appears normal. There is no focal airspace consolidation, pleural effusion, or pneumothorax. There are mild degenerative changes of the thoracic spine.     Impression: 1. Right-sided chest port in expected position. 2. No acute cardiopulmonary abnormality. Electronically Signed: Alec Morse  9/8/2024 12:11 PM EDT  Workstation ID: [id number]       Differential Diagnosis and Discussion:    Neck Pain: The patient presents with neck pain. My differential diagnosis includes but is not limited to acute spinal epidural abscess, acute spinal epidural bleed, meningitis, musculoskeletal neck pain,  spinal fracture, and osteoarthritis.     All labs were reviewed and interpreted by me.  All X-rays impressions were independently interpreted by me.  EKG was interpreted by me.  CT scan radiology impression was interpreted by me.    MDM     Amount and/or Complexity of Data Reviewed  Clinical lab tests: reviewed  Tests in the radiology section of CPT®: reviewed  Tests in the medicine section of CPT®: reviewed             Patient Care Considerations:    ANTIBIOTICS: I considered prescribing antibiotics as an outpatient however no bacterial focus of infection was found.      Consultants/Shared Management Plan:    None    Social Determinants of Health:    Patient is independent, reliable, and has access to care.       Disposition and Care Coordination:    Discharged: I considered escalation of care by admitting this patient to the hospital, however there were no acute findings on ED workup to warrant inpatient admission.    I have explained the patient´s condition, diagnoses and treatment plan based on the information available to me at this time. I have answered questions and addressed any concerns. The patient has a good  understanding of the patient´s diagnosis, condition, and treatment plan as can be expected at this point. The vital signs have been stable. The patient´s condition is stable and appropriate for discharge from the emergency department.      The patient will pursue further outpatient evaluation with the primary care physician or other designated or consulting physician as outlined in the discharge instructions. They are agreeable to this plan of care and follow-up instructions have been explained in detail. The patient has received these instructions in written format and has expressed an understanding of the discharge instructions. The patient is aware that any significant change in condition or worsening of symptoms should prompt an immediate return to this or the closest emergency department or call  to 911.  I have explained discharge medications and the need for follow up with the patient/caretakers. This was also printed in the discharge instructions. Patient was discharged with the following medications and follow up:      Medication List        New Prescriptions      cyclobenzaprine 10 MG tablet  Commonly known as: FLEXERIL  Take 1 tablet by mouth 3 (Three) Times a Day As Needed for Muscle Spasms.     diclofenac 75 MG EC tablet  Commonly known as: VOLTAREN  Take 1 tablet by mouth 2 (Two) Times a Day As Needed (Pain).               Where to Get Your Medications        These medications were sent to [facility]/pharmacy #[id number] - [place] - [address] - [phone]  - [phone] FX  [address] [place] [address]      Hours: 24-hours Phone: [phone]   cyclobenzaprine 10 MG tablet  diclofenac 75 MG EC tablet      Victoria Russell, APRN  [address]  [place] [address]  [phone]    In 1 week  If symptoms fail to resolve or improve      Final diagnoses:   Neck pain on right side        ED Disposition       ED Disposition   Discharge    Condition   Stable    Comment   --               This medical record created using voice recognition software.             Mando Ramirez DO  09/11/24 6750

## 2024-09-12 LAB
QT INTERVAL: 396 MS
QT INTERVAL: 427 MS
QTC INTERVAL: 416 MS
QTC INTERVAL: 416 MS

## 2024-10-08 ENCOUNTER — TELEPHONE (OUTPATIENT)
Dept: ONCOLOGY | Facility: HOSPITAL | Age: 44
End: 2024-10-08
Payer: MEDICAID

## 2024-10-08 NOTE — TELEPHONE ENCOUNTER
Caller: Suzy Whitaker    Relationship to patient: Self    Best call back number: [phone]    Chief complaint: SCHEDULING    Type of visit: PORT FLUSH    Requested date: 10-10 OR 10-11 ANYTIME OR NEXT AVLIABLE BEFORE 2     If rescheduling, when is the original appointment: 9-16

## 2024-10-14 ENCOUNTER — HOSPITAL ENCOUNTER (OUTPATIENT)
Dept: ONCOLOGY | Facility: HOSPITAL | Age: 44
Discharge: HOME | End: 2024-10-14
Admitting: INTERNAL MEDICINE

## 2024-10-14 DIAGNOSIS — Z45.2 ENCOUNTER FOR ADJUSTMENT OR MANAGEMENT OF VASCULAR ACCESS DEVICE: Primary | ICD-10-CM

## 2024-10-14 DIAGNOSIS — Z95.828 PORT-A-CATH IN PLACE: ICD-10-CM

## 2024-10-14 PROCEDURE — 96523 IRRIG DRUG DELIVERY DEVICE: CPT

## 2024-10-14 PROCEDURE — 25010000002 HEPARIN LOCK FLUSH PER 10 UNITS: Performed by: INTERNAL MEDICINE

## 2024-10-14 RX ORDER — HEPARIN SODIUM (PORCINE) LOCK FLUSH IV SOLN 100 UNIT/ML 100 UNIT/ML
500 SOLUTION INTRAVENOUS AS NEEDED
Status: DISCONTINUED | OUTPATIENT
Start: 2024-10-14 | End: 2024-10-15 | Stop reason: HOSPADM

## 2024-10-14 RX ORDER — SODIUM CHLORIDE 0.9 % (FLUSH) 0.9 %
20 SYRINGE (ML) INJECTION AS NEEDED
OUTPATIENT
Start: 2024-10-14

## 2024-10-14 RX ORDER — SODIUM CHLORIDE 0.9 % (FLUSH) 0.9 %
20 SYRINGE (ML) INJECTION AS NEEDED
Status: DISCONTINUED | OUTPATIENT
Start: 2024-10-14 | End: 2024-10-15 | Stop reason: HOSPADM

## 2024-10-14 RX ORDER — HEPARIN SODIUM (PORCINE) LOCK FLUSH IV SOLN 100 UNIT/ML 100 UNIT/ML
500 SOLUTION INTRAVENOUS AS NEEDED
OUTPATIENT
Start: 2024-10-14

## 2024-10-14 RX ADMIN — Medication 20 ML: at 15:07

## 2024-10-14 RX ADMIN — HEPARIN 500 UNITS: 100 SYRINGE at 15:07

## 2024-10-30 ENCOUNTER — TRANSCRIBE ORDERS (OUTPATIENT)
Dept: ADMINISTRATIVE | Facility: HOSPITAL | Age: 44
End: 2024-10-30
Payer: MEDICAID

## 2024-10-30 DIAGNOSIS — I10 ESSENTIAL HYPERTENSION, MALIGNANT: Primary | ICD-10-CM

## 2024-12-02 ENCOUNTER — TELEPHONE (OUTPATIENT)
Dept: ONCOLOGY | Facility: HOSPITAL | Age: 44
End: 2024-12-02

## 2024-12-02 NOTE — TELEPHONE ENCOUNTER
Caller: Suzy Whitaker    Relationship to patient: Self    Best call back number: [phone]    Type of visit: [facility] FLU    Requested date: ANY LATE AFTERNOON     If rescheduling, when is the original appointment: 11/26     Additional notes: PLEASE CALL TO R/S.

## 2024-12-06 ENCOUNTER — HOSPITAL ENCOUNTER (OUTPATIENT)
Dept: ONCOLOGY | Facility: HOSPITAL | Age: 44
Discharge: HOME | End: 2024-12-06

## 2024-12-06 DIAGNOSIS — Z95.828 PORT-A-CATH IN PLACE: ICD-10-CM

## 2024-12-06 DIAGNOSIS — Z45.2 ENCOUNTER FOR ADJUSTMENT OR MANAGEMENT OF VASCULAR ACCESS DEVICE: Primary | ICD-10-CM

## 2024-12-06 PROCEDURE — 25010000002 HEPARIN LOCK FLUSH PER 10 UNITS: Performed by: INTERNAL MEDICINE

## 2024-12-06 PROCEDURE — 96523 IRRIG DRUG DELIVERY DEVICE: CPT

## 2024-12-06 RX ORDER — HEPARIN SODIUM (PORCINE) LOCK FLUSH IV SOLN 100 UNIT/ML 100 UNIT/ML
500 SOLUTION INTRAVENOUS AS NEEDED
OUTPATIENT
Start: 2024-12-06

## 2024-12-06 RX ORDER — SODIUM CHLORIDE 0.9 % (FLUSH) 0.9 %
20 SYRINGE (ML) INJECTION AS NEEDED
Status: DISCONTINUED | OUTPATIENT
Start: 2024-12-06 | End: 2024-12-07 | Stop reason: HOSPADM

## 2024-12-06 RX ORDER — HEPARIN SODIUM (PORCINE) LOCK FLUSH IV SOLN 100 UNIT/ML 100 UNIT/ML
500 SOLUTION INTRAVENOUS AS NEEDED
Status: DISCONTINUED | OUTPATIENT
Start: 2024-12-06 | End: 2024-12-07 | Stop reason: HOSPADM

## 2024-12-06 RX ORDER — SODIUM CHLORIDE 0.9 % (FLUSH) 0.9 %
20 SYRINGE (ML) INJECTION AS NEEDED
OUTPATIENT
Start: 2024-12-06

## 2024-12-06 RX ADMIN — HEPARIN 500 UNITS: 100 SYRINGE at 13:21

## 2024-12-06 RX ADMIN — Medication 20 ML: at 13:22

## 2024-12-18 ENCOUNTER — TELEPHONE (OUTPATIENT)
Dept: ONCOLOGY | Facility: HOSPITAL | Age: 44
End: 2024-12-18

## 2024-12-18 NOTE — TELEPHONE ENCOUNTER
Diagnosis: Qualitative platelet disorder     Reason for Referral: Health insurance    Content of Visit: [facility]W received a phone call from Mrs. Whitaker advising that she has no health insurance coverage and is looking for assistance with her port flushes and medical expenses. Mrs. Whitaker shares that she's been without health insurance since June. She recently got  on 10/19/24, exactly 60 days ago from today. Advised that her marriage is a qualifying event to access health insurance coverage, however, only for 60 days after the event. She advised that she and her spouse are looking into health insurance coverage through [employer] and will try to get this finalized today. Her spouse recently started a new job through the [employer] and has health insurance through his employer, however, to add her onto his health insurance it would cost $400 every pay period, which is not affordable. She had contacted Stacy earlier today regarding Medicaid benefits and was advised they are over income. They did not discuss with her the opportunity to apply for a qualified health plan. Advised that Stacy can assist her in seeing which health insurance plans are available to access and compare to one another. She may also qualify for assistance with her monthly premium. Will send her this information if interested in pursuing, but again, would encourage her to do so today due to the time constraint of her qualifying event. Encouraged she ensure her elected health insurance plan is in network with her healthcare providers/[facility]. Also requested she please notify us if she accesses health insurance so we may add this on file and complete any PA that may be required. Lastly, discussed opportunity to apply for the  financial assistance program. Reviewed the income/resource threshold. Will provide her with a copy of the application to be completed. Offered my assistance in navigating these resources reviewed today  and encouraged OSW support remains available. She will plan to contact OSW as needed.    Resources/Referrals Provided: Education regarding health insurance and qualifying events, qualified health plan through [employer] financial assistance program application

## 2025-02-18 ENCOUNTER — TELEPHONE (OUTPATIENT)
Dept: ONCOLOGY | Facility: HOSPITAL | Age: 45
End: 2025-02-18

## 2025-02-18 DIAGNOSIS — D69.1 QUALITATIVE PLATELET DISORDER: Primary | ICD-10-CM

## 2025-02-18 RX ORDER — SODIUM CHLORIDE 9 MG/ML
250 INJECTION, SOLUTION INTRAVENOUS AS NEEDED
Status: CANCELLED | OUTPATIENT
Start: 2025-02-18 | End: 2025-02-18

## 2025-02-18 RX ORDER — HEPARIN SODIUM (PORCINE) LOCK FLUSH IV SOLN 100 UNIT/ML 100 UNIT/ML
500 SOLUTION INTRAVENOUS AS NEEDED
Status: CANCELLED | OUTPATIENT
Start: 2025-02-18

## 2025-02-18 RX ORDER — SODIUM CHLORIDE 0.9 % (FLUSH) 0.9 %
20 SYRINGE (ML) INJECTION AS NEEDED
Status: CANCELLED | OUTPATIENT
Start: 2025-02-18

## 2025-02-18 NOTE — TELEPHONE ENCOUNTER
The pt called and c/o bruising in her lower extremities x1 week. The pt states that the bruising is significant. The pt states that she has a Plt disorder and needs a Plt transfusion from time to time. The pt states that she feel like she needs a transfusion.     Note: The pt is SELF PAY. The pt is willing to do whatever Dr [address] wants her to do but wants to keep the visits/cost at a minimum if possible.

## 2025-02-18 NOTE — TELEPHONE ENCOUNTER
LEFT MESSAGE FOR PATIENT, SHE NEEDS TO BE SCHEDULED FOR LABS PRIOR TO PLATELETS, LAST CBC WAS 09/2024. ASKED FOR PATIENT TO CALL OFFICE BACK.

## 2025-02-19 ENCOUNTER — HOSPITAL ENCOUNTER (OUTPATIENT)
Dept: ONCOLOGY | Facility: HOSPITAL | Age: 45
Discharge: HOME | End: 2025-02-19
Admitting: INTERNAL MEDICINE
Payer: MEDICAID

## 2025-02-19 ENCOUNTER — DOCUMENTATION (OUTPATIENT)
Dept: ONCOLOGY | Facility: HOSPITAL | Age: 45
End: 2025-02-19
Payer: MEDICAID

## 2025-02-19 DIAGNOSIS — D69.1 QUALITATIVE PLATELET DISORDER: ICD-10-CM

## 2025-02-19 DIAGNOSIS — Z95.828 PORT-A-CATH IN PLACE: Primary | ICD-10-CM

## 2025-02-19 LAB
BASOPHILS # BLD AUTO: 0.03 10*3/MM3 (ref 0–0.2)
BASOPHILS NFR BLD AUTO: 0.5 % (ref 0–1.5)
DEPRECATED RDW RBC AUTO: 43.2 FL (ref 37–54)
EOSINOPHIL # BLD AUTO: 0.1 10*3/MM3 (ref 0–0.4)
EOSINOPHIL NFR BLD AUTO: 1.8 % (ref 0.3–6.2)
ERYTHROCYTE [DISTWIDTH] IN BLOOD BY AUTOMATED COUNT: 13.1 % (ref 12.3–15.4)
HCT VFR BLD AUTO: 37.9 % (ref 34–46.6)
HGB BLD-MCNC: 12.8 G/DL (ref 12–15.9)
IMM GRANULOCYTES # BLD AUTO: 0 10*3/MM3 (ref 0–0.05)
IMM GRANULOCYTES NFR BLD AUTO: 0 % (ref 0–0.5)
LYMPHOCYTES # BLD AUTO: 1.89 10*3/MM3 (ref 0.7–3.1)
LYMPHOCYTES NFR BLD AUTO: 34.5 % (ref 19.6–45.3)
MCH RBC QN AUTO: 30.8 PG (ref 26.6–33)
MCHC RBC AUTO-ENTMCNC: 33.8 G/DL (ref 31.5–35.7)
MCV RBC AUTO: 91.1 FL (ref 79–97)
MONOCYTES # BLD AUTO: 0.23 10*3/MM3 (ref 0.1–0.9)
MONOCYTES NFR BLD AUTO: 4.2 % (ref 5–12)
NEUTROPHILS NFR BLD AUTO: 3.23 10*3/MM3 (ref 1.7–7)
NEUTROPHILS NFR BLD AUTO: 59 % (ref 42.7–76)
NRBC BLD AUTO-RTO: 0 /100 WBC (ref 0–0.2)
PLATELET # BLD AUTO: 237 10*3/MM3 (ref 140–450)
PMV BLD AUTO: 8.8 FL (ref 6–12)
RBC # BLD AUTO: 4.16 10*6/MM3 (ref 3.77–5.28)
WBC NRBC COR # BLD AUTO: 5.48 10*3/MM3 (ref 3.4–10.8)

## 2025-02-19 PROCEDURE — 36591 DRAW BLOOD OFF VENOUS DEVICE: CPT

## 2025-02-19 PROCEDURE — 25010000002 HEPARIN LOCK FLUSH PER 10 UNITS: Performed by: INTERNAL MEDICINE

## 2025-02-19 PROCEDURE — 85025 COMPLETE CBC W/AUTO DIFF WBC: CPT | Performed by: INTERNAL MEDICINE

## 2025-02-19 RX ORDER — SODIUM CHLORIDE 0.9 % (FLUSH) 0.9 %
20 SYRINGE (ML) INJECTION AS NEEDED
Status: DISCONTINUED | OUTPATIENT
Start: 2025-02-19 | End: 2025-02-20 | Stop reason: HOSPADM

## 2025-02-19 RX ORDER — SODIUM CHLORIDE 0.9 % (FLUSH) 0.9 %
20 SYRINGE (ML) INJECTION AS NEEDED
Status: CANCELLED | OUTPATIENT
Start: 2025-02-19

## 2025-02-19 RX ORDER — HEPARIN SODIUM (PORCINE) LOCK FLUSH IV SOLN 100 UNIT/ML 100 UNIT/ML
500 SOLUTION INTRAVENOUS AS NEEDED
Status: CANCELLED | OUTPATIENT
Start: 2025-02-19

## 2025-02-19 RX ORDER — HEPARIN SODIUM (PORCINE) LOCK FLUSH IV SOLN 100 UNIT/ML 100 UNIT/ML
500 SOLUTION INTRAVENOUS AS NEEDED
Status: DISCONTINUED | OUTPATIENT
Start: 2025-02-19 | End: 2025-02-20 | Stop reason: HOSPADM

## 2025-02-19 RX ADMIN — Medication 20 ML: at 14:17

## 2025-02-19 RX ADMIN — HEPARIN 500 UNITS: 100 SYRINGE at 14:17

## 2025-02-19 NOTE — PROGRESS NOTES
Patient called [facility]W to ask for assistance with completing the returning of additional documents to Financial office. Patient stated she will have $100 for today's visit but will not be able to pay $100 for treatment visits. Patient brought her bank statements to OSW to send to the financial team. OSW submitted those documents to financial counselors.

## 2025-02-20 ENCOUNTER — HOSPITAL ENCOUNTER (OUTPATIENT)
Dept: ONCOLOGY | Facility: HOSPITAL | Age: 45
Discharge: HOME | End: 2025-02-20
Payer: MEDICAID

## 2025-02-20 ENCOUNTER — TELEPHONE (OUTPATIENT)
Dept: ONCOLOGY | Facility: HOSPITAL | Age: 45
End: 2025-02-20

## 2025-02-20 VITALS
OXYGEN SATURATION: 98 % | DIASTOLIC BLOOD PRESSURE: 70 MMHG | HEART RATE: 61 BPM | TEMPERATURE: 98.3 F | SYSTOLIC BLOOD PRESSURE: 96 MMHG | RESPIRATION RATE: 18 BRPM

## 2025-02-20 DIAGNOSIS — D69.1 QUALITATIVE PLATELET DISORDER: Primary | ICD-10-CM

## 2025-02-20 DIAGNOSIS — D69.1 QUALITATIVE PLATELET DISORDER: ICD-10-CM

## 2025-02-20 DIAGNOSIS — Z95.828 PORT-A-CATH IN PLACE: Primary | ICD-10-CM

## 2025-02-20 LAB
ABO GROUP BLD: NORMAL
RH BLD: POSITIVE

## 2025-02-20 PROCEDURE — 25010000002 HEPARIN LOCK FLUSH PER 10 UNITS: Performed by: INTERNAL MEDICINE

## 2025-02-20 PROCEDURE — 86900 BLOOD TYPING SEROLOGIC ABO: CPT | Performed by: INTERNAL MEDICINE

## 2025-02-20 PROCEDURE — 86901 BLOOD TYPING SEROLOGIC RH(D): CPT | Performed by: INTERNAL MEDICINE

## 2025-02-20 RX ORDER — SODIUM CHLORIDE 0.9 % (FLUSH) 0.9 %
20 SYRINGE (ML) INJECTION AS NEEDED
Status: DISCONTINUED | OUTPATIENT
Start: 2025-02-20 | End: 2025-02-21 | Stop reason: HOSPADM

## 2025-02-20 RX ORDER — HEPARIN SODIUM (PORCINE) LOCK FLUSH IV SOLN 100 UNIT/ML 100 UNIT/ML
500 SOLUTION INTRAVENOUS AS NEEDED
OUTPATIENT
Start: 2025-02-20

## 2025-02-20 RX ORDER — HEPARIN SODIUM (PORCINE) LOCK FLUSH IV SOLN 100 UNIT/ML 100 UNIT/ML
500 SOLUTION INTRAVENOUS AS NEEDED
Status: DISCONTINUED | OUTPATIENT
Start: 2025-02-20 | End: 2025-02-21 | Stop reason: HOSPADM

## 2025-02-20 RX ORDER — SODIUM CHLORIDE 0.9 % (FLUSH) 0.9 %
20 SYRINGE (ML) INJECTION AS NEEDED
OUTPATIENT
Start: 2025-02-20

## 2025-02-20 RX ORDER — SODIUM CHLORIDE 9 MG/ML
250 INJECTION, SOLUTION INTRAVENOUS AS NEEDED
OUTPATIENT
Start: 2025-02-20 | End: 2025-02-20

## 2025-02-20 RX ORDER — SODIUM CHLORIDE 9 MG/ML
250 INJECTION, SOLUTION INTRAVENOUS AS NEEDED
Status: ACTIVE | OUTPATIENT
Start: 2025-02-20 | End: 2025-02-20

## 2025-02-20 RX ADMIN — Medication 20 ML: at 16:30

## 2025-02-20 RX ADMIN — HEPARIN 500 UNITS: 100 SYRINGE at 16:30

## 2025-02-20 NOTE — TELEPHONE ENCOUNTER
Caller: CARLITA BROWNLEE [facility]      Chief complaint: APPOINTMENT NEEDS CANCELLED, PATIENT HAD PORT FLUSHED YESTERDAY AT THE HOSPITAL      Type of visit: PORT FLUSH       If rescheduling, when is the original appointment: 2-21-25

## 2025-02-22 LAB
BH BB BLOOD EXPIRATION DATE: NORMAL
BH BB BLOOD TYPE BARCODE: 8400
BH BB DISPENSE STATUS: NORMAL
BH BB PRODUCT CODE: NORMAL
BH BB UNIT NUMBER: NORMAL
UNIT  ABO: NORMAL
UNIT  RH: NORMAL

## 2025-02-25 ENCOUNTER — DOCUMENTATION (OUTPATIENT)
Dept: ONCOLOGY | Facility: HOSPITAL | Age: 45
End: 2025-02-25

## 2025-02-25 NOTE — PROGRESS NOTES
OSW checked the status of Mrs. Whitaker's  financial assistance application and saw where she was denied. OSW reached out to Annabel WRIGHT in the financial department to inquire if she received the bank statements my colleague sent over on 2/19. She had not and will take a look at these statements. OSW also forwarded her all the previous documents Mrs. Julianne Mckeon and her spouse provided to ensure the financial department has received everything that's been produced. OSW support remains available.    Mrs. Holloways application is now pending again.    Update 2/28/25: OSW received further correspondence from the financial team. They advised that upon reviewing the application, they have inputted the income based on paystubs and SSI, and it appears Mrs. Holloways household of two will be over income. If they do not agree, then she can supply the missing documentation. The financial department will need her federal taxes and the banking statements from where her SSI is deposited into. If they have any questions or concerns, the financial department may be contacted at [phone]. OSW relayed this to Mrs. Whitaker and her spouse. OSW support remains available.    Update 3/3/25: OSW received an email from Mrs. Whitaker's spouse advising that she no longer receives SSI. She used to, but hasn't in close to 2 years, which he explains is the reason for applying for financial assistance. She used to be on disability, but decided to try working and going to college instead. As a result, she lost all types of financing from the SSI and her insurance was lost, forcing them to pay out of pocket. OSW forwarded this information to the financial counselor, Annabel WRIGHT. The financial counselor advised they will need her taxes and they may contact the financial department to further discuss at [phone]. OSW relayed this to her spouse. OSW support remains available.

## 2025-03-24 ENCOUNTER — HOSPITAL ENCOUNTER (EMERGENCY)
Facility: HOSPITAL | Age: 45
Discharge: HOME | End: 2025-03-24
Attending: EMERGENCY MEDICINE | Admitting: EMERGENCY MEDICINE
Payer: MEDICAID

## 2025-03-24 ENCOUNTER — APPOINTMENT (OUTPATIENT)
Dept: CT IMAGING | Facility: HOSPITAL | Age: 45
End: 2025-03-24
Payer: MEDICAID

## 2025-03-24 VITALS
DIASTOLIC BLOOD PRESSURE: 88 MMHG | BODY MASS INDEX: 28.72 KG/M2 | WEIGHT: 172.4 LBS | SYSTOLIC BLOOD PRESSURE: 130 MMHG | HEART RATE: 66 BPM | RESPIRATION RATE: 18 BRPM | OXYGEN SATURATION: 99 % | TEMPERATURE: 98.6 F | HEIGHT: 65 IN

## 2025-03-24 DIAGNOSIS — R51.9 HEADACHE, UNSPECIFIED HEADACHE TYPE: Primary | ICD-10-CM

## 2025-03-24 PROCEDURE — 99284 EMERGENCY DEPT VISIT MOD MDM: CPT

## 2025-03-24 PROCEDURE — 70450 CT HEAD/BRAIN W/O DYE: CPT

## 2025-03-24 RX ORDER — BUTALBITAL, ACETAMINOPHEN AND CAFFEINE 50; 325; 40 MG/1; MG/1; MG/1
1 TABLET ORAL ONCE
Status: COMPLETED | OUTPATIENT
Start: 2025-03-24 | End: 2025-03-24

## 2025-03-24 RX ADMIN — BUTALBITAL, ACETAMINOPHEN AND CAFFEINE 1 TABLET: 325; 50; 40 TABLET ORAL at 12:51

## 2025-03-24 NOTE — DISCHARGE INSTRUCTIONS
Your CT scan did not show any acute abnormalities.  This may be a variant of your migraine headache.  Continue take your previously prescribed migraine medication.  If your symptoms persist follow-up with your audiologist.    Return for any new concerns.

## 2025-03-24 NOTE — ED PROVIDER NOTES
Time: 12:11 PM EDT  Date of encounter:  3/24/2025  Independent Historian/Clinical History and Information was obtained by:   Patient    History is limited by: N/A    Chief Complaint: Headache, tinnitus      History of Present Illness:  Patient is a 45 y.o. year old female who presents to the emergency department for evaluation.  Patient reports this morning when she got to work she noticed a strange beeping noise in her ear primarily on the right.  Patient wears hearing aids.  Initially she thought it was some equipment in her workspace that was causing the noise.  One of her coworkers denied that there was such a noise.  She removed hearing aids and noise persisted in her ear.  She then proceeded to go outside and yet the noise still persisted in her ear.  She then developed a right posterior headache.  The patient admits to history of migraines but states that this headache is significantly different.  She has had some photophobia.  She denies blurry vision, nausea/vomiting, dizziness, or balance issues.  She denies head injury.  She denies recent illness, fever/chills.  She typically takes Fioricet for her migraine headaches but states this headache is so different she wanted to get it checked.      Patient Care Team  Primary Care Provider: Victoria Russell APRN    Past Medical History:     Allergies   Allergen Reactions    Adhesive Tape Rash    Morphine Other (See Comments)     CAUSES CHEST PAIN    Aspirin GI Bleeding     Past Medical History:   Diagnosis Date    Anxiety     Bleeding disorder     Blood disease     Depression     Disease of thyroid gland     Migraine     Mood disorder     Ovarian cyst     Urinary tract infection      Past Surgical History:   Procedure Laterality Date    COLONOSCOPY      GALLBLADDER SURGERY      HYSTERECTOMY      PORTACATH PLACEMENT      WISDOM TOOTH EXTRACTION       Family History   Problem Relation Age of Onset    Diabetes Mother     Other Mother         blood diseases     "Stroke Father     Heart disease Father     Cancer Father     Diabetes Sister     Kidney cancer Maternal Grandmother     Breast cancer Neg Hx     Colon cancer Neg Hx     Ovarian cancer Neg Hx     Uterine cancer Neg Hx     Prostate cancer Neg Hx        Home Medications:  Prior to Admission medications    Medication Sig Start Date End Date Taking? Authorizing Provider   cyclobenzaprine (FLEXERIL) 10 MG tablet Take 1 tablet by mouth 3 (Three) Times a Day As Needed for Muscle Spasms. 9/8/24   Mando aRmirez DO   diclofenac (VOLTAREN) 75 MG EC tablet Take 1 tablet by mouth 2 (Two) Times a Day As Needed (Pain). 9/8/24   Mando Ramirez DO   levothyroxine (SYNTHROID, LEVOTHROID) 25 MCG tablet Take 1 tablet by mouth Daily. 10/17/22   ProviderDebbie MD   venlafaxine XR (EFFEXOR-XR) 75 MG 24 hr capsule Take 1 capsule by mouth Daily.    ProviderDebbie MD        Social History:   Social History     Tobacco Use    Smoking status: Never     Passive exposure: Past    Smokeless tobacco: Never    Tobacco comments:     01/01/1990   Vaping Use    Vaping status: Never Used   Substance Use Topics    Alcohol use: Not Currently    Drug use: Yes     Types: Marijuana         Review of Systems:  Review of Systems   HENT:          Tinnitus   Eyes:  Positive for photophobia.   Neurological:  Positive for headaches.   All other systems reviewed and are negative.       Physical Exam:  /88 (BP Location: Left arm, Patient Position: Sitting)   Pulse 66   Temp 98.6 °F (37 °C) (Oral)   Resp 18   Ht 165.1 cm (65\")   Wt 78.2 kg (172 lb 6.4 oz)   SpO2 99%   BMI 28.69 kg/m²     Physical Exam     General: Awake alert and in no acute distress     HEENT: Head normocephalic atraumatic, normal TM/canal bilaterally, eyes PERRLA EOMI, nose normal, oropharynx normal.     Neck: Supple full range of motion, no meningismus, no lymphadenopathy     Heart: Regular rate and rhythm, no murmurs or rubs, 2+ radial pulses bilaterally     Lungs: Clear to " auscultation bilaterally without wheezes or crackles, no respiratory distress     Abdomen: Soft, nontender, nondistended, no rebound or guarding     Back exam:  No L-spine tenderness.  No rash.     Skin: Warm, dry, no rash     Musculoskeletal: Normal range of motion, no lower extremity edema     Neurologic: Oriented x3, no motor deficits no sensory deficits     Psychiatric: Mood appears stable, no psychosis            Medical Decision Making:      Comorbidities that affect care:    [place], bleeding disorder, Thyroid Disease    External Notes reviewed:    Previous Clinic Note: 2/20/2025 seen by Dr. Beth for qualitative platelet disorder      The following orders were placed and all results were independently analyzed by me:  Orders Placed This Encounter   Procedures    CT Head Without Contrast       Medications Given in the Emergency Department:  Medications   butalbital-acetaminophen-caffeine (FIORICET, ESGIC) -40 MG per tablet 1 tablet (1 tablet Oral Given 3/24/25 1251)        ED Course:    ED Course as of 03/24/25 1307   Mon Mar 24, 2025   1303 CT Head Without Contrast  No acute findings [CW]      ED Course User Index  [CW] Negra Hurst APRN       Labs:    Lab Results (last 24 hours)       ** No results found for the last 24 hours. **             Imaging:    CT Head Without Contrast  Result Date: 3/24/2025  CT HEAD WO CONTRAST Date of Exam: 3/24/2025 12:30 PM EDT Indication: Right posterior headache. Comparison: 3/31/2023 Technique: Axial CT images were obtained of the head without contrast administration.  Reconstructed coronal and sagittal images were also obtained. Automated exposure control and iterative construction methods were used. Findings: There is no evidence of acute territorial infarction. There is no acute intracranial hemorrhage. There are no extra-axial collections. Ventricles and CSF spaces are symmetric. No mass effect nor hydrocephalus. Brain parenchyma appears normal for age.   Paranasal sinuses and mastoid air cells are adequately aerated.  Osseous structures and orbits appear intact.     Impression: 1.No acute process identified Electronically Signed: Db Jones MD  3/24/2025 12:47 PM EDT  Workstation ID: [id number]        Differential Diagnosis and Discussion:    Headache: Differential diagnosis includes but is not limited to migraine, cluster headache, hypertension, tumor, subarachnoid bleeding, pseudotumor cerebri, temporal arteritis, infections, tension headache, and TMJ syndrome.    PROCEDURES:    CT scan was performed in the emergency department and the CT scan radiology impression was interpreted by me.    No orders to display       Procedures    MDM  Number of Diagnoses or Management Options  Headache, unspecified headache type  Diagnosis management comments: The patient presented to the emergency department with a headache.  CT scan did not reveal any acute abnormalities.  Patient was given Fioricet and reports small improvement since it was only given recently.  Repeat examination is unremarkable and benign. The patient is neurologically intact, has a normal mental status, and this ambulatory in the ED. The history, exam, diagnostic testing (if any) and the patient's current condition do not suggest meningitis, stroke, sepsis, subarachnoid hemorrhage, intracranial bleeding, encephalitis, temporal arteritis or other significant pathology to warrant further testing, continued ED treatment, admission, neurological consultation, for other specialist evaluation at this point. The vital signs have been stable. The patient's condition is stable and appropriate for discharge. The patient will pursue further outpatient evaluation with the primary care physician or other designated or consulting physician as indicated in the discharge instructions.       Amount and/or Complexity of Data Reviewed  Tests in the radiology section of CPT®: reviewed and ordered    Risk of Complications,  Morbidity, and/or Mortality  Presenting problems: minimal  Diagnostic procedures: minimal  Management options: minimal    Patient Progress  Patient progress: improved                       Patient Care Considerations:          Consultants/Shared Management Plan:        Social Determinants of Health:          Disposition and Care Coordination:            Final diagnoses:   Headache, unspecified headache type        ED Disposition       ED Disposition   Discharge    Condition   Stable    Comment   --               This medical record created using voice recognition software.             Negra Hurst, APRN  03/24/25 8543